# Patient Record
Sex: MALE | Race: BLACK OR AFRICAN AMERICAN | URBAN - METROPOLITAN AREA
[De-identification: names, ages, dates, MRNs, and addresses within clinical notes are randomized per-mention and may not be internally consistent; named-entity substitution may affect disease eponyms.]

---

## 2017-01-10 ENCOUNTER — OFFICE VISIT (OUTPATIENT)
Dept: INTERNAL MEDICINE CLINIC | Age: 57
End: 2017-01-10

## 2017-01-10 VITALS
DIASTOLIC BLOOD PRESSURE: 72 MMHG | WEIGHT: 203.8 LBS | BODY MASS INDEX: 32.89 KG/M2 | SYSTOLIC BLOOD PRESSURE: 130 MMHG | HEART RATE: 76 BPM | OXYGEN SATURATION: 97 %

## 2017-01-10 DIAGNOSIS — E78.00 PURE HYPERCHOLESTEROLEMIA: ICD-10-CM

## 2017-01-10 DIAGNOSIS — Z23 NEED FOR PNEUMOCOCCAL VACCINE: ICD-10-CM

## 2017-01-10 DIAGNOSIS — I10 ESSENTIAL HYPERTENSION: Primary | ICD-10-CM

## 2017-01-10 DIAGNOSIS — Z87.39 HISTORY OF GOUT: ICD-10-CM

## 2017-01-10 LAB
ANION GAP SERPL CALCULATED.3IONS-SCNC: 15 MMOL/L (ref 3–16)
BUN BLDV-MCNC: 10 MG/DL (ref 7–20)
CALCIUM SERPL-MCNC: 9.3 MG/DL (ref 8.3–10.6)
CHLORIDE BLD-SCNC: 103 MMOL/L (ref 99–110)
CHOLESTEROL, TOTAL: 152 MG/DL (ref 0–199)
CO2: 24 MMOL/L (ref 21–32)
CREAT SERPL-MCNC: 0.8 MG/DL (ref 0.9–1.3)
CREATININE URINE: 211.4 MG/DL (ref 39–259)
GFR AFRICAN AMERICAN: >60
GFR NON-AFRICAN AMERICAN: >60
GLUCOSE BLD-MCNC: 102 MG/DL (ref 70–99)
HDLC SERPL-MCNC: 31 MG/DL (ref 40–60)
LDL CHOLESTEROL CALCULATED: 100 MG/DL
MICROALBUMIN UR-MCNC: <1.2 MG/DL
MICROALBUMIN/CREAT UR-RTO: NORMAL MG/G (ref 0–30)
POTASSIUM SERPL-SCNC: 4.4 MMOL/L (ref 3.5–5.1)
SODIUM BLD-SCNC: 142 MMOL/L (ref 136–145)
TRIGL SERPL-MCNC: 104 MG/DL (ref 0–150)
VLDLC SERPL CALC-MCNC: 21 MG/DL

## 2017-01-10 PROCEDURE — G8484 FLU IMMUNIZE NO ADMIN: HCPCS | Performed by: INTERNAL MEDICINE

## 2017-01-10 PROCEDURE — G0009 ADMIN PNEUMOCOCCAL VACCINE: HCPCS | Performed by: INTERNAL MEDICINE

## 2017-01-10 PROCEDURE — G8427 DOCREV CUR MEDS BY ELIG CLIN: HCPCS | Performed by: INTERNAL MEDICINE

## 2017-01-10 PROCEDURE — 4004F PT TOBACCO SCREEN RCVD TLK: CPT | Performed by: INTERNAL MEDICINE

## 2017-01-10 PROCEDURE — 3017F COLORECTAL CA SCREEN DOC REV: CPT | Performed by: INTERNAL MEDICINE

## 2017-01-10 PROCEDURE — 99214 OFFICE O/P EST MOD 30 MIN: CPT | Performed by: INTERNAL MEDICINE

## 2017-01-10 PROCEDURE — 90732 PPSV23 VACC 2 YRS+ SUBQ/IM: CPT | Performed by: INTERNAL MEDICINE

## 2017-01-10 PROCEDURE — G8419 CALC BMI OUT NRM PARAM NOF/U: HCPCS | Performed by: INTERNAL MEDICINE

## 2017-01-10 RX ORDER — ALLOPURINOL 300 MG/1
300 TABLET ORAL DAILY
Qty: 30 TABLET | Refills: 3 | Status: SHIPPED | OUTPATIENT
Start: 2017-01-10 | End: 2017-01-11 | Stop reason: SDUPTHER

## 2017-01-11 ENCOUNTER — TELEPHONE (OUTPATIENT)
Dept: INTERNAL MEDICINE CLINIC | Age: 57
End: 2017-01-11

## 2017-01-11 DIAGNOSIS — I10 ESSENTIAL HYPERTENSION: ICD-10-CM

## 2017-01-11 DIAGNOSIS — Z87.39 HISTORY OF GOUT: ICD-10-CM

## 2017-01-11 DIAGNOSIS — E78.5 HYPERLIPIDEMIA, UNSPECIFIED HYPERLIPIDEMIA TYPE: ICD-10-CM

## 2017-01-11 DIAGNOSIS — M10.9 ACUTE GOUT OF RIGHT FOOT, UNSPECIFIED CAUSE: ICD-10-CM

## 2017-01-11 RX ORDER — AMLODIPINE BESYLATE 5 MG/1
5 TABLET ORAL DAILY
Qty: 30 TABLET | Refills: 2 | Status: SHIPPED | OUTPATIENT
Start: 2017-01-11 | End: 2017-02-09 | Stop reason: SDUPTHER

## 2017-01-11 RX ORDER — ALLOPURINOL 300 MG/1
300 TABLET ORAL DAILY
Qty: 30 TABLET | Refills: 3 | Status: SHIPPED | OUTPATIENT
Start: 2017-01-11 | End: 2017-02-09 | Stop reason: SDUPTHER

## 2017-01-11 RX ORDER — METHOCARBAMOL 500 MG/1
500 TABLET, FILM COATED ORAL 3 TIMES DAILY
Qty: 30 TABLET | Refills: 0 | Status: SHIPPED | OUTPATIENT
Start: 2017-01-11 | End: 2017-02-07 | Stop reason: SDUPTHER

## 2017-01-11 RX ORDER — INDOMETHACIN 50 MG/1
50 CAPSULE ORAL 3 TIMES DAILY
Qty: 42 CAPSULE | Refills: 0 | Status: SHIPPED | OUTPATIENT
Start: 2017-01-11 | End: 2018-12-24

## 2017-01-11 RX ORDER — ATORVASTATIN CALCIUM 40 MG/1
40 TABLET, FILM COATED ORAL DAILY
Qty: 30 TABLET | Refills: 2 | Status: SHIPPED | OUTPATIENT
Start: 2017-01-11 | End: 2017-02-09 | Stop reason: SDUPTHER

## 2017-01-11 RX ORDER — COLCHICINE 0.6 MG/1
0.6 TABLET ORAL DAILY
Qty: 30 TABLET | Refills: 2 | Status: SHIPPED | OUTPATIENT
Start: 2017-01-11 | End: 2017-02-09 | Stop reason: SDUPTHER

## 2017-02-09 ENCOUNTER — TELEPHONE (OUTPATIENT)
Dept: INTERNAL MEDICINE CLINIC | Age: 57
End: 2017-02-09

## 2017-02-09 DIAGNOSIS — Z87.39 HISTORY OF GOUT: ICD-10-CM

## 2017-02-09 DIAGNOSIS — I10 ESSENTIAL HYPERTENSION: ICD-10-CM

## 2017-02-09 DIAGNOSIS — E78.5 HYPERLIPIDEMIA, UNSPECIFIED HYPERLIPIDEMIA TYPE: ICD-10-CM

## 2017-02-09 DIAGNOSIS — M10.9 ACUTE GOUT OF RIGHT FOOT, UNSPECIFIED CAUSE: ICD-10-CM

## 2017-02-09 RX ORDER — COLCHICINE 0.6 MG/1
0.6 TABLET ORAL DAILY
Qty: 90 TABLET | Refills: 2 | Status: SHIPPED | OUTPATIENT
Start: 2017-02-09 | End: 2018-03-22

## 2017-02-09 RX ORDER — AMLODIPINE BESYLATE 5 MG/1
5 TABLET ORAL DAILY
Qty: 90 TABLET | Refills: 2 | Status: SHIPPED | OUTPATIENT
Start: 2017-02-09 | End: 2017-11-29 | Stop reason: SDUPTHER

## 2017-02-09 RX ORDER — ALLOPURINOL 300 MG/1
300 TABLET ORAL DAILY
Qty: 90 TABLET | Refills: 3 | Status: SHIPPED | OUTPATIENT
Start: 2017-02-09 | End: 2018-04-19 | Stop reason: SDUPTHER

## 2017-02-09 RX ORDER — ATORVASTATIN CALCIUM 40 MG/1
40 TABLET, FILM COATED ORAL DAILY
Qty: 90 TABLET | Refills: 2 | Status: SHIPPED | OUTPATIENT
Start: 2017-02-09 | End: 2018-03-08 | Stop reason: SDUPTHER

## 2017-02-21 ENCOUNTER — OFFICE VISIT (OUTPATIENT)
Dept: INTERNAL MEDICINE CLINIC | Age: 57
End: 2017-02-21

## 2017-02-21 VITALS
HEART RATE: 69 BPM | OXYGEN SATURATION: 97 % | SYSTOLIC BLOOD PRESSURE: 116 MMHG | HEIGHT: 66 IN | BODY MASS INDEX: 32.4 KG/M2 | WEIGHT: 201.6 LBS | DIASTOLIC BLOOD PRESSURE: 72 MMHG

## 2017-02-21 DIAGNOSIS — I10 ESSENTIAL HYPERTENSION: ICD-10-CM

## 2017-02-21 DIAGNOSIS — F17.208 NICOTINE DEPENDENCE WITH OTHER NICOTINE-INDUCED DISORDER: ICD-10-CM

## 2017-02-21 DIAGNOSIS — M62.838 MUSCLE SPASM: ICD-10-CM

## 2017-02-21 DIAGNOSIS — E78.00 PURE HYPERCHOLESTEROLEMIA: ICD-10-CM

## 2017-02-21 PROCEDURE — G8484 FLU IMMUNIZE NO ADMIN: HCPCS | Performed by: INTERNAL MEDICINE

## 2017-02-21 PROCEDURE — 3017F COLORECTAL CA SCREEN DOC REV: CPT | Performed by: INTERNAL MEDICINE

## 2017-02-21 PROCEDURE — G8417 CALC BMI ABV UP PARAM F/U: HCPCS | Performed by: INTERNAL MEDICINE

## 2017-02-21 PROCEDURE — 4004F PT TOBACCO SCREEN RCVD TLK: CPT | Performed by: INTERNAL MEDICINE

## 2017-02-21 PROCEDURE — 99214 OFFICE O/P EST MOD 30 MIN: CPT | Performed by: INTERNAL MEDICINE

## 2017-02-21 PROCEDURE — G8427 DOCREV CUR MEDS BY ELIG CLIN: HCPCS | Performed by: INTERNAL MEDICINE

## 2017-02-21 RX ORDER — METHOCARBAMOL 500 MG/1
500 TABLET, FILM COATED ORAL DAILY PRN
Qty: 30 TABLET | Refills: 1 | Status: SHIPPED | OUTPATIENT
Start: 2017-02-21 | End: 2017-03-23

## 2017-02-21 ASSESSMENT — PATIENT HEALTH QUESTIONNAIRE - PHQ9
SUM OF ALL RESPONSES TO PHQ9 QUESTIONS 1 & 2: 0
SUM OF ALL RESPONSES TO PHQ QUESTIONS 1-9: 0
2. FEELING DOWN, DEPRESSED OR HOPELESS: 0
1. LITTLE INTEREST OR PLEASURE IN DOING THINGS: 0

## 2017-03-06 ASSESSMENT — ENCOUNTER SYMPTOMS
RESPIRATORY NEGATIVE: 1
EYES NEGATIVE: 1
GASTROINTESTINAL NEGATIVE: 1

## 2017-04-05 RX ORDER — METHOCARBAMOL 500 MG/1
TABLET, FILM COATED ORAL
Qty: 30 TABLET | Refills: 1 | Status: SHIPPED | OUTPATIENT
Start: 2017-04-05 | End: 2017-05-26 | Stop reason: SDUPTHER

## 2017-05-15 ENCOUNTER — OFFICE VISIT (OUTPATIENT)
Dept: INTERNAL MEDICINE CLINIC | Age: 57
End: 2017-05-15

## 2017-05-15 VITALS
BODY MASS INDEX: 32.12 KG/M2 | SYSTOLIC BLOOD PRESSURE: 168 MMHG | TEMPERATURE: 97.3 F | DIASTOLIC BLOOD PRESSURE: 80 MMHG | OXYGEN SATURATION: 98 % | WEIGHT: 199 LBS | HEART RATE: 66 BPM

## 2017-05-15 DIAGNOSIS — M10.9 ACUTE GOUT OF LEFT WRIST, UNSPECIFIED CAUSE: Primary | ICD-10-CM

## 2017-05-15 DIAGNOSIS — M10.9 ACUTE GOUT OF RIGHT FOOT, UNSPECIFIED CAUSE: ICD-10-CM

## 2017-05-15 DIAGNOSIS — I10 ESSENTIAL HYPERTENSION: ICD-10-CM

## 2017-05-15 DIAGNOSIS — M25.532 ARTHRALGIA OF LEFT WRIST: ICD-10-CM

## 2017-05-15 LAB — URIC ACID, SERUM: 2.8 MG/DL (ref 3.5–7.2)

## 2017-05-15 PROCEDURE — 3017F COLORECTAL CA SCREEN DOC REV: CPT | Performed by: NURSE PRACTITIONER

## 2017-05-15 PROCEDURE — 96372 THER/PROPH/DIAG INJ SC/IM: CPT | Performed by: NURSE PRACTITIONER

## 2017-05-15 PROCEDURE — G8427 DOCREV CUR MEDS BY ELIG CLIN: HCPCS | Performed by: NURSE PRACTITIONER

## 2017-05-15 PROCEDURE — 99213 OFFICE O/P EST LOW 20 MIN: CPT | Performed by: NURSE PRACTITIONER

## 2017-05-15 PROCEDURE — 4004F PT TOBACCO SCREEN RCVD TLK: CPT | Performed by: NURSE PRACTITIONER

## 2017-05-15 PROCEDURE — G8417 CALC BMI ABV UP PARAM F/U: HCPCS | Performed by: NURSE PRACTITIONER

## 2017-05-15 RX ORDER — TRIAMCINOLONE ACETONIDE 40 MG/ML
40 INJECTION, SUSPENSION INTRA-ARTICULAR; INTRAMUSCULAR ONCE
Status: COMPLETED | OUTPATIENT
Start: 2017-05-15 | End: 2017-05-15

## 2017-05-15 RX ADMIN — TRIAMCINOLONE ACETONIDE 40 MG: 40 INJECTION, SUSPENSION INTRA-ARTICULAR; INTRAMUSCULAR at 09:46

## 2017-05-15 ASSESSMENT — ENCOUNTER SYMPTOMS
EYES NEGATIVE: 1
GASTROINTESTINAL NEGATIVE: 1
RESPIRATORY NEGATIVE: 1
ALLERGIC/IMMUNOLOGIC NEGATIVE: 1

## 2017-05-16 ENCOUNTER — TELEPHONE (OUTPATIENT)
Dept: INTERNAL MEDICINE CLINIC | Age: 57
End: 2017-05-16

## 2017-05-23 ENCOUNTER — OFFICE VISIT (OUTPATIENT)
Dept: INTERNAL MEDICINE CLINIC | Age: 57
End: 2017-05-23

## 2017-05-23 VITALS
OXYGEN SATURATION: 98 % | HEART RATE: 59 BPM | BODY MASS INDEX: 32.77 KG/M2 | DIASTOLIC BLOOD PRESSURE: 80 MMHG | SYSTOLIC BLOOD PRESSURE: 138 MMHG | WEIGHT: 203 LBS

## 2017-05-23 DIAGNOSIS — Z87.39 H/O: GOUT: ICD-10-CM

## 2017-05-23 DIAGNOSIS — I10 ESSENTIAL HYPERTENSION: ICD-10-CM

## 2017-05-23 DIAGNOSIS — E78.49 OTHER HYPERLIPIDEMIA: ICD-10-CM

## 2017-05-23 PROCEDURE — G8427 DOCREV CUR MEDS BY ELIG CLIN: HCPCS | Performed by: INTERNAL MEDICINE

## 2017-05-23 PROCEDURE — G8417 CALC BMI ABV UP PARAM F/U: HCPCS | Performed by: INTERNAL MEDICINE

## 2017-05-23 PROCEDURE — 4004F PT TOBACCO SCREEN RCVD TLK: CPT | Performed by: INTERNAL MEDICINE

## 2017-05-23 PROCEDURE — 99214 OFFICE O/P EST MOD 30 MIN: CPT | Performed by: INTERNAL MEDICINE

## 2017-05-23 PROCEDURE — 3017F COLORECTAL CA SCREEN DOC REV: CPT | Performed by: INTERNAL MEDICINE

## 2017-05-26 RX ORDER — METHOCARBAMOL 500 MG/1
TABLET, FILM COATED ORAL
Qty: 30 TABLET | Refills: 1 | Status: SHIPPED | OUTPATIENT
Start: 2017-05-26 | End: 2017-08-08 | Stop reason: SDUPTHER

## 2017-05-29 ASSESSMENT — ENCOUNTER SYMPTOMS
EYES NEGATIVE: 1
GASTROINTESTINAL NEGATIVE: 1
RESPIRATORY NEGATIVE: 1

## 2017-06-02 DIAGNOSIS — M19.90 ARTHRITIS: Primary | ICD-10-CM

## 2017-06-05 DIAGNOSIS — M19.90 ARTHRITIS: ICD-10-CM

## 2017-06-05 LAB
C-REACTIVE PROTEIN: 0.7 MG/L (ref 0–5.1)
RHEUMATOID FACTOR: <10 IU/ML
SEDIMENTATION RATE, ERYTHROCYTE: 10 MM/HR (ref 0–20)

## 2017-06-06 LAB
ANA INTERPRETATION: NORMAL
ANTI-NUCLEAR ANTIBODY (ANA): NEGATIVE

## 2017-07-19 ENCOUNTER — TELEPHONE (OUTPATIENT)
Dept: INTERNAL MEDICINE CLINIC | Age: 57
End: 2017-07-19

## 2017-07-19 RX ORDER — COLCHICINE 0.6 MG/1
1 CAPSULE ORAL DAILY
Qty: 90 CAPSULE | Refills: 0 | COMMUNITY
Start: 2017-07-19 | End: 2019-07-25 | Stop reason: SDUPTHER

## 2017-08-08 RX ORDER — METHOCARBAMOL 500 MG/1
TABLET, FILM COATED ORAL
Qty: 30 TABLET | Refills: 1 | Status: SHIPPED | OUTPATIENT
Start: 2017-08-08 | End: 2017-10-03 | Stop reason: SDUPTHER

## 2017-10-03 RX ORDER — METHOCARBAMOL 500 MG/1
TABLET, FILM COATED ORAL
Qty: 90 TABLET | Refills: 1 | Status: SHIPPED | OUTPATIENT
Start: 2017-10-03 | End: 2018-03-22

## 2017-11-29 DIAGNOSIS — I10 ESSENTIAL HYPERTENSION: ICD-10-CM

## 2017-12-01 RX ORDER — AMLODIPINE BESYLATE 5 MG/1
5 TABLET ORAL DAILY
Qty: 90 TABLET | Refills: 2 | Status: SHIPPED | OUTPATIENT
Start: 2017-12-01 | End: 2018-08-27 | Stop reason: SDUPTHER

## 2018-03-08 DIAGNOSIS — E78.5 HYPERLIPIDEMIA, UNSPECIFIED HYPERLIPIDEMIA TYPE: ICD-10-CM

## 2018-03-08 RX ORDER — ATORVASTATIN CALCIUM 40 MG/1
40 TABLET, FILM COATED ORAL DAILY
Qty: 90 TABLET | Refills: 2 | Status: SHIPPED | OUTPATIENT
Start: 2018-03-08 | End: 2018-11-22 | Stop reason: SDUPTHER

## 2018-03-22 ENCOUNTER — OFFICE VISIT (OUTPATIENT)
Dept: INTERNAL MEDICINE CLINIC | Age: 58
End: 2018-03-22

## 2018-03-22 VITALS
HEIGHT: 66 IN | BODY MASS INDEX: 33.59 KG/M2 | HEART RATE: 66 BPM | SYSTOLIC BLOOD PRESSURE: 170 MMHG | WEIGHT: 209 LBS | OXYGEN SATURATION: 97 % | DIASTOLIC BLOOD PRESSURE: 70 MMHG

## 2018-03-22 DIAGNOSIS — I10 ESSENTIAL HYPERTENSION: ICD-10-CM

## 2018-03-22 DIAGNOSIS — E78.2 MIXED HYPERLIPIDEMIA: ICD-10-CM

## 2018-03-22 DIAGNOSIS — M62.838 MUSCLE SPASM: ICD-10-CM

## 2018-03-22 DIAGNOSIS — Z87.39 H/O: GOUT: ICD-10-CM

## 2018-03-22 PROCEDURE — 99214 OFFICE O/P EST MOD 30 MIN: CPT | Performed by: INTERNAL MEDICINE

## 2018-03-22 PROCEDURE — G8417 CALC BMI ABV UP PARAM F/U: HCPCS | Performed by: INTERNAL MEDICINE

## 2018-03-22 PROCEDURE — 3017F COLORECTAL CA SCREEN DOC REV: CPT | Performed by: INTERNAL MEDICINE

## 2018-03-22 PROCEDURE — G8427 DOCREV CUR MEDS BY ELIG CLIN: HCPCS | Performed by: INTERNAL MEDICINE

## 2018-03-22 PROCEDURE — 4004F PT TOBACCO SCREEN RCVD TLK: CPT | Performed by: INTERNAL MEDICINE

## 2018-03-22 PROCEDURE — G8484 FLU IMMUNIZE NO ADMIN: HCPCS | Performed by: INTERNAL MEDICINE

## 2018-03-22 RX ORDER — ALLOPURINOL 100 MG/1
100 TABLET ORAL DAILY
Qty: 30 TABLET | Refills: 3 | Status: SHIPPED | OUTPATIENT
Start: 2018-03-22 | End: 2018-05-17

## 2018-03-22 RX ORDER — METHOCARBAMOL 500 MG/1
500 TABLET, FILM COATED ORAL 2 TIMES DAILY PRN
Qty: 60 TABLET | Refills: 1 | Status: SHIPPED | OUTPATIENT
Start: 2018-03-22 | End: 2018-05-14 | Stop reason: SDUPTHER

## 2018-03-22 ASSESSMENT — PATIENT HEALTH QUESTIONNAIRE - PHQ9
2. FEELING DOWN, DEPRESSED OR HOPELESS: 0
SUM OF ALL RESPONSES TO PHQ9 QUESTIONS 1 & 2: 0
1. LITTLE INTEREST OR PLEASURE IN DOING THINGS: 0
SUM OF ALL RESPONSES TO PHQ QUESTIONS 1-9: 0

## 2018-04-07 ASSESSMENT — ENCOUNTER SYMPTOMS
RESPIRATORY NEGATIVE: 1
EYES NEGATIVE: 1
GASTROINTESTINAL NEGATIVE: 1

## 2018-04-19 DIAGNOSIS — Z87.39 HISTORY OF GOUT: ICD-10-CM

## 2018-04-19 RX ORDER — ALLOPURINOL 300 MG/1
300 TABLET ORAL DAILY
Qty: 90 TABLET | Refills: 3 | Status: SHIPPED | OUTPATIENT
Start: 2018-04-19 | End: 2019-04-04 | Stop reason: SDUPTHER

## 2018-05-14 DIAGNOSIS — M62.838 MUSCLE SPASM: ICD-10-CM

## 2018-05-14 RX ORDER — METHOCARBAMOL 500 MG/1
500 TABLET, FILM COATED ORAL 2 TIMES DAILY PRN
Qty: 60 TABLET | Refills: 1 | Status: SHIPPED | OUTPATIENT
Start: 2018-05-14 | End: 2018-07-10 | Stop reason: SDUPTHER

## 2018-05-17 ENCOUNTER — OFFICE VISIT (OUTPATIENT)
Dept: INTERNAL MEDICINE CLINIC | Age: 58
End: 2018-05-17

## 2018-05-17 VITALS
BODY MASS INDEX: 33.75 KG/M2 | WEIGHT: 210 LBS | HEART RATE: 86 BPM | SYSTOLIC BLOOD PRESSURE: 148 MMHG | DIASTOLIC BLOOD PRESSURE: 86 MMHG | HEIGHT: 66 IN | OXYGEN SATURATION: 98 %

## 2018-05-17 DIAGNOSIS — I10 ESSENTIAL HYPERTENSION: ICD-10-CM

## 2018-05-17 DIAGNOSIS — Z13.1 ENCOUNTER FOR SCREENING FOR DIABETES MELLITUS: ICD-10-CM

## 2018-05-17 DIAGNOSIS — I73.9 PAD (PERIPHERAL ARTERY DISEASE) (HCC): ICD-10-CM

## 2018-05-17 DIAGNOSIS — G62.9 NEUROPATHY: ICD-10-CM

## 2018-05-17 DIAGNOSIS — F17.219 CIGARETTE NICOTINE DEPENDENCE WITH NICOTINE-INDUCED DISORDER: ICD-10-CM

## 2018-05-17 DIAGNOSIS — R06.02 SOB (SHORTNESS OF BREATH) ON EXERTION: ICD-10-CM

## 2018-05-17 DIAGNOSIS — E78.2 MIXED HYPERLIPIDEMIA: ICD-10-CM

## 2018-05-17 PROCEDURE — 3017F COLORECTAL CA SCREEN DOC REV: CPT | Performed by: INTERNAL MEDICINE

## 2018-05-17 PROCEDURE — G8427 DOCREV CUR MEDS BY ELIG CLIN: HCPCS | Performed by: INTERNAL MEDICINE

## 2018-05-17 PROCEDURE — 4004F PT TOBACCO SCREEN RCVD TLK: CPT | Performed by: INTERNAL MEDICINE

## 2018-05-17 PROCEDURE — G8417 CALC BMI ABV UP PARAM F/U: HCPCS | Performed by: INTERNAL MEDICINE

## 2018-05-17 PROCEDURE — 99214 OFFICE O/P EST MOD 30 MIN: CPT | Performed by: INTERNAL MEDICINE

## 2018-05-17 RX ORDER — GABAPENTIN 100 MG/1
100 CAPSULE ORAL 3 TIMES DAILY PRN
Qty: 90 CAPSULE | Refills: 3 | Status: SHIPPED | OUTPATIENT
Start: 2018-05-17 | End: 2018-09-04 | Stop reason: SDUPTHER

## 2018-05-28 PROBLEM — I73.9 PAD (PERIPHERAL ARTERY DISEASE) (HCC): Status: ACTIVE | Noted: 2018-05-28

## 2018-05-28 ASSESSMENT — ENCOUNTER SYMPTOMS
GASTROINTESTINAL NEGATIVE: 1
EYES NEGATIVE: 1

## 2018-05-29 ENCOUNTER — TELEPHONE (OUTPATIENT)
Dept: INTERNAL MEDICINE CLINIC | Age: 58
End: 2018-05-29

## 2018-06-19 ENCOUNTER — TELEPHONE (OUTPATIENT)
Dept: INTERNAL MEDICINE CLINIC | Age: 58
End: 2018-06-19

## 2018-06-19 DIAGNOSIS — M79.89 SWELLING OF LOWER EXTREMITY: Primary | ICD-10-CM

## 2018-06-19 DIAGNOSIS — I73.9 PAD (PERIPHERAL ARTERY DISEASE) (HCC): ICD-10-CM

## 2018-06-19 DIAGNOSIS — I82.593 CHRONIC DEEP VEIN THROMBOSIS (DVT) OF OTHER VEIN OF BOTH LOWER EXTREMITIES (HCC): ICD-10-CM

## 2018-06-20 ENCOUNTER — TELEPHONE (OUTPATIENT)
Dept: INTERNAL MEDICINE CLINIC | Age: 58
End: 2018-06-20

## 2018-06-20 DIAGNOSIS — I73.9 PAD (PERIPHERAL ARTERY DISEASE) (HCC): ICD-10-CM

## 2018-06-20 DIAGNOSIS — M79.89 SWELLING OF LOWER EXTREMITY: Primary | ICD-10-CM

## 2018-06-27 ENCOUNTER — TELEPHONE (OUTPATIENT)
Dept: INTERNAL MEDICINE CLINIC | Age: 58
End: 2018-06-27

## 2018-07-10 DIAGNOSIS — M62.838 MUSCLE SPASM: ICD-10-CM

## 2018-07-10 RX ORDER — METHOCARBAMOL 500 MG/1
500 TABLET, FILM COATED ORAL 2 TIMES DAILY PRN
Qty: 60 TABLET | Refills: 1 | Status: SHIPPED | OUTPATIENT
Start: 2018-07-10 | End: 2018-08-09

## 2018-08-27 DIAGNOSIS — I10 ESSENTIAL HYPERTENSION: ICD-10-CM

## 2018-08-27 RX ORDER — AMLODIPINE BESYLATE 5 MG/1
5 TABLET ORAL DAILY
Qty: 90 TABLET | Refills: 2 | Status: SHIPPED | OUTPATIENT
Start: 2018-08-27 | End: 2019-05-20 | Stop reason: SDUPTHER

## 2018-09-04 DIAGNOSIS — G62.9 NEUROPATHY: ICD-10-CM

## 2018-09-04 RX ORDER — GABAPENTIN 100 MG/1
100 CAPSULE ORAL 3 TIMES DAILY PRN
Qty: 90 CAPSULE | Refills: 1 | Status: SHIPPED | OUTPATIENT
Start: 2018-09-04 | End: 2019-03-21 | Stop reason: SDUPTHER

## 2018-09-05 RX ORDER — METHOCARBAMOL 500 MG/1
TABLET, FILM COATED ORAL
Qty: 60 TABLET | Refills: 1 | Status: SHIPPED | OUTPATIENT
Start: 2018-09-05 | End: 2018-12-12 | Stop reason: SDUPTHER

## 2018-09-13 ENCOUNTER — OFFICE VISIT (OUTPATIENT)
Dept: INTERNAL MEDICINE CLINIC | Age: 58
End: 2018-09-13

## 2018-09-13 VITALS
DIASTOLIC BLOOD PRESSURE: 70 MMHG | HEIGHT: 66 IN | BODY MASS INDEX: 33.75 KG/M2 | WEIGHT: 210 LBS | OXYGEN SATURATION: 98 % | HEART RATE: 86 BPM | SYSTOLIC BLOOD PRESSURE: 136 MMHG

## 2018-09-13 DIAGNOSIS — Z87.39 H/O: GOUT: ICD-10-CM

## 2018-09-13 DIAGNOSIS — R73.9 ELEVATED BLOOD SUGAR: ICD-10-CM

## 2018-09-13 DIAGNOSIS — R97.20 ELEVATED PSA: ICD-10-CM

## 2018-09-13 DIAGNOSIS — Z23 NEED FOR PROPHYLACTIC VACCINATION AND INOCULATION AGAINST VARICELLA: ICD-10-CM

## 2018-09-13 DIAGNOSIS — I10 ESSENTIAL HYPERTENSION: ICD-10-CM

## 2018-09-13 DIAGNOSIS — J98.4 RESTRICTIVE LUNG DISEASE: ICD-10-CM

## 2018-09-13 DIAGNOSIS — I10 ESSENTIAL HYPERTENSION: Primary | ICD-10-CM

## 2018-09-13 DIAGNOSIS — E78.2 MIXED HYPERLIPIDEMIA: ICD-10-CM

## 2018-09-13 DIAGNOSIS — Z12.11 SCREENING FOR COLON CANCER: ICD-10-CM

## 2018-09-13 LAB
ANION GAP SERPL CALCULATED.3IONS-SCNC: 16 MMOL/L (ref 3–16)
BUN BLDV-MCNC: 8 MG/DL (ref 7–20)
CALCIUM SERPL-MCNC: 9.8 MG/DL (ref 8.3–10.6)
CHLORIDE BLD-SCNC: 103 MMOL/L (ref 99–110)
CHOLESTEROL, TOTAL: 161 MG/DL (ref 0–199)
CO2: 23 MMOL/L (ref 21–32)
CREAT SERPL-MCNC: 0.8 MG/DL (ref 0.8–1.3)
CREATININE URINE: 122.1 MG/DL (ref 39–259)
GFR AFRICAN AMERICAN: >60
GFR NON-AFRICAN AMERICAN: >60
GLUCOSE BLD-MCNC: 116 MG/DL (ref 70–99)
HDLC SERPL-MCNC: 32 MG/DL (ref 40–60)
LDL CHOLESTEROL CALCULATED: 94 MG/DL
MICROALBUMIN UR-MCNC: <1.2 MG/DL
MICROALBUMIN/CREAT UR-RTO: NORMAL MG/G (ref 0–30)
POTASSIUM SERPL-SCNC: 4.8 MMOL/L (ref 3.5–5.1)
PROSTATE SPECIFIC ANTIGEN: 3.72 NG/ML (ref 0–4)
SODIUM BLD-SCNC: 142 MMOL/L (ref 136–145)
TRIGL SERPL-MCNC: 175 MG/DL (ref 0–150)
TSH REFLEX: 0.95 UIU/ML (ref 0.27–4.2)
URIC ACID, SERUM: 3.4 MG/DL (ref 3.5–7.2)
VLDLC SERPL CALC-MCNC: 35 MG/DL

## 2018-09-13 PROCEDURE — G8427 DOCREV CUR MEDS BY ELIG CLIN: HCPCS | Performed by: INTERNAL MEDICINE

## 2018-09-13 PROCEDURE — 99214 OFFICE O/P EST MOD 30 MIN: CPT | Performed by: INTERNAL MEDICINE

## 2018-09-13 PROCEDURE — 4004F PT TOBACCO SCREEN RCVD TLK: CPT | Performed by: INTERNAL MEDICINE

## 2018-09-13 PROCEDURE — G8417 CALC BMI ABV UP PARAM F/U: HCPCS | Performed by: INTERNAL MEDICINE

## 2018-09-13 PROCEDURE — 3017F COLORECTAL CA SCREEN DOC REV: CPT | Performed by: INTERNAL MEDICINE

## 2018-09-13 ASSESSMENT — PATIENT HEALTH QUESTIONNAIRE - PHQ9
1. LITTLE INTEREST OR PLEASURE IN DOING THINGS: 0
2. FEELING DOWN, DEPRESSED OR HOPELESS: 0
SUM OF ALL RESPONSES TO PHQ QUESTIONS 1-9: 0
SUM OF ALL RESPONSES TO PHQ9 QUESTIONS 1 & 2: 0
SUM OF ALL RESPONSES TO PHQ QUESTIONS 1-9: 0

## 2018-09-14 LAB
ESTIMATED AVERAGE GLUCOSE: 145.6 MG/DL
HBA1C MFR BLD: 6.7 %

## 2018-09-30 ASSESSMENT — ENCOUNTER SYMPTOMS
GASTROINTESTINAL NEGATIVE: 1
EYES NEGATIVE: 1

## 2018-11-22 DIAGNOSIS — E78.5 HYPERLIPIDEMIA, UNSPECIFIED HYPERLIPIDEMIA TYPE: ICD-10-CM

## 2018-11-23 RX ORDER — ATORVASTATIN CALCIUM 40 MG/1
40 TABLET, FILM COATED ORAL DAILY
Qty: 90 TABLET | Refills: 2 | Status: SHIPPED | OUTPATIENT
Start: 2018-11-23 | End: 2019-06-26 | Stop reason: SDUPTHER

## 2018-12-12 ENCOUNTER — OFFICE VISIT (OUTPATIENT)
Dept: INTERNAL MEDICINE CLINIC | Age: 58
End: 2018-12-12
Payer: MEDICARE

## 2018-12-12 VITALS
BODY MASS INDEX: 34.2 KG/M2 | WEIGHT: 212.8 LBS | OXYGEN SATURATION: 98 % | DIASTOLIC BLOOD PRESSURE: 78 MMHG | SYSTOLIC BLOOD PRESSURE: 132 MMHG | HEIGHT: 66 IN | HEART RATE: 56 BPM

## 2018-12-12 DIAGNOSIS — I10 ESSENTIAL HYPERTENSION: ICD-10-CM

## 2018-12-12 DIAGNOSIS — Z12.11 COLON CANCER SCREENING: ICD-10-CM

## 2018-12-12 DIAGNOSIS — E78.2 MIXED HYPERLIPIDEMIA: ICD-10-CM

## 2018-12-12 DIAGNOSIS — M62.838 MUSCLE SPASM: ICD-10-CM

## 2018-12-12 DIAGNOSIS — E11.8 TYPE 2 DIABETES MELLITUS WITH COMPLICATION, WITHOUT LONG-TERM CURRENT USE OF INSULIN (HCC): Primary | ICD-10-CM

## 2018-12-12 DIAGNOSIS — Z87.39 H/O: GOUT: ICD-10-CM

## 2018-12-12 PROCEDURE — 99214 OFFICE O/P EST MOD 30 MIN: CPT | Performed by: INTERNAL MEDICINE

## 2018-12-12 PROCEDURE — 3044F HG A1C LEVEL LT 7.0%: CPT | Performed by: INTERNAL MEDICINE

## 2018-12-12 PROCEDURE — G8427 DOCREV CUR MEDS BY ELIG CLIN: HCPCS | Performed by: INTERNAL MEDICINE

## 2018-12-12 PROCEDURE — 2022F DILAT RTA XM EVC RTNOPTHY: CPT | Performed by: INTERNAL MEDICINE

## 2018-12-12 PROCEDURE — 4004F PT TOBACCO SCREEN RCVD TLK: CPT | Performed by: INTERNAL MEDICINE

## 2018-12-12 PROCEDURE — G8484 FLU IMMUNIZE NO ADMIN: HCPCS | Performed by: INTERNAL MEDICINE

## 2018-12-12 PROCEDURE — 3017F COLORECTAL CA SCREEN DOC REV: CPT | Performed by: INTERNAL MEDICINE

## 2018-12-12 PROCEDURE — G8417 CALC BMI ABV UP PARAM F/U: HCPCS | Performed by: INTERNAL MEDICINE

## 2018-12-12 RX ORDER — METHOCARBAMOL 500 MG/1
500 TABLET, FILM COATED ORAL DAILY PRN
Qty: 60 TABLET | Refills: 0 | Status: SHIPPED | OUTPATIENT
Start: 2018-12-12 | End: 2019-06-11 | Stop reason: SDUPTHER

## 2018-12-12 ASSESSMENT — PATIENT HEALTH QUESTIONNAIRE - PHQ9
SUM OF ALL RESPONSES TO PHQ9 QUESTIONS 1 & 2: 0
2. FEELING DOWN, DEPRESSED OR HOPELESS: 0
SUM OF ALL RESPONSES TO PHQ QUESTIONS 1-9: 0
1. LITTLE INTEREST OR PLEASURE IN DOING THINGS: 0
SUM OF ALL RESPONSES TO PHQ QUESTIONS 1-9: 0

## 2018-12-24 PROBLEM — E11.8 TYPE 2 DIABETES MELLITUS WITH COMPLICATION, WITHOUT LONG-TERM CURRENT USE OF INSULIN (HCC): Status: ACTIVE | Noted: 2018-12-24

## 2018-12-24 ASSESSMENT — ENCOUNTER SYMPTOMS
GASTROINTESTINAL NEGATIVE: 1
EYES NEGATIVE: 1
RESPIRATORY NEGATIVE: 1

## 2019-01-31 ENCOUNTER — OFFICE VISIT (OUTPATIENT)
Dept: INTERNAL MEDICINE CLINIC | Age: 59
End: 2019-01-31
Payer: MEDICARE

## 2019-01-31 VITALS
OXYGEN SATURATION: 98 % | WEIGHT: 212 LBS | SYSTOLIC BLOOD PRESSURE: 136 MMHG | HEIGHT: 66 IN | BODY MASS INDEX: 34.07 KG/M2 | HEART RATE: 70 BPM | DIASTOLIC BLOOD PRESSURE: 78 MMHG

## 2019-01-31 DIAGNOSIS — E78.2 MIXED HYPERLIPIDEMIA: ICD-10-CM

## 2019-01-31 DIAGNOSIS — Z12.11 COLON CANCER SCREENING: ICD-10-CM

## 2019-01-31 DIAGNOSIS — E11.8 TYPE 2 DIABETES MELLITUS WITH COMPLICATION, WITHOUT LONG-TERM CURRENT USE OF INSULIN (HCC): Primary | ICD-10-CM

## 2019-01-31 DIAGNOSIS — I10 ESSENTIAL HYPERTENSION: ICD-10-CM

## 2019-01-31 LAB
GLUCOSE BLD-MCNC: 102 MG/DL
HBA1C MFR BLD: 6.1 %

## 2019-01-31 PROCEDURE — 83036 HEMOGLOBIN GLYCOSYLATED A1C: CPT | Performed by: INTERNAL MEDICINE

## 2019-01-31 PROCEDURE — 3017F COLORECTAL CA SCREEN DOC REV: CPT | Performed by: INTERNAL MEDICINE

## 2019-01-31 PROCEDURE — 4004F PT TOBACCO SCREEN RCVD TLK: CPT | Performed by: INTERNAL MEDICINE

## 2019-01-31 PROCEDURE — G8417 CALC BMI ABV UP PARAM F/U: HCPCS | Performed by: INTERNAL MEDICINE

## 2019-01-31 PROCEDURE — 3044F HG A1C LEVEL LT 7.0%: CPT | Performed by: INTERNAL MEDICINE

## 2019-01-31 PROCEDURE — G8484 FLU IMMUNIZE NO ADMIN: HCPCS | Performed by: INTERNAL MEDICINE

## 2019-01-31 PROCEDURE — G8427 DOCREV CUR MEDS BY ELIG CLIN: HCPCS | Performed by: INTERNAL MEDICINE

## 2019-01-31 PROCEDURE — 99214 OFFICE O/P EST MOD 30 MIN: CPT | Performed by: INTERNAL MEDICINE

## 2019-01-31 PROCEDURE — 82962 GLUCOSE BLOOD TEST: CPT | Performed by: INTERNAL MEDICINE

## 2019-01-31 PROCEDURE — 2022F DILAT RTA XM EVC RTNOPTHY: CPT | Performed by: INTERNAL MEDICINE

## 2019-01-31 ASSESSMENT — PATIENT HEALTH QUESTIONNAIRE - PHQ9
SUM OF ALL RESPONSES TO PHQ9 QUESTIONS 1 & 2: 0
2. FEELING DOWN, DEPRESSED OR HOPELESS: 0
SUM OF ALL RESPONSES TO PHQ QUESTIONS 1-9: 0
SUM OF ALL RESPONSES TO PHQ QUESTIONS 1-9: 0
1. LITTLE INTEREST OR PLEASURE IN DOING THINGS: 0

## 2019-02-01 ASSESSMENT — ENCOUNTER SYMPTOMS
EYES NEGATIVE: 1
RESPIRATORY NEGATIVE: 1
GASTROINTESTINAL NEGATIVE: 1

## 2019-03-05 ENCOUNTER — OFFICE VISIT (OUTPATIENT)
Dept: INTERNAL MEDICINE CLINIC | Age: 59
End: 2019-03-05
Payer: MEDICARE

## 2019-03-05 VITALS
HEIGHT: 66 IN | SYSTOLIC BLOOD PRESSURE: 136 MMHG | HEART RATE: 68 BPM | OXYGEN SATURATION: 97 % | DIASTOLIC BLOOD PRESSURE: 76 MMHG | WEIGHT: 207 LBS | BODY MASS INDEX: 33.27 KG/M2

## 2019-03-05 DIAGNOSIS — E11.8 TYPE 2 DIABETES MELLITUS WITH COMPLICATION, WITHOUT LONG-TERM CURRENT USE OF INSULIN (HCC): ICD-10-CM

## 2019-03-05 DIAGNOSIS — I10 ESSENTIAL HYPERTENSION: Primary | ICD-10-CM

## 2019-03-05 DIAGNOSIS — Z87.39 H/O: GOUT: ICD-10-CM

## 2019-03-05 DIAGNOSIS — E78.2 MIXED HYPERLIPIDEMIA: ICD-10-CM

## 2019-03-05 PROCEDURE — 2022F DILAT RTA XM EVC RTNOPTHY: CPT | Performed by: INTERNAL MEDICINE

## 2019-03-05 PROCEDURE — 4004F PT TOBACCO SCREEN RCVD TLK: CPT | Performed by: INTERNAL MEDICINE

## 2019-03-05 PROCEDURE — 3044F HG A1C LEVEL LT 7.0%: CPT | Performed by: INTERNAL MEDICINE

## 2019-03-05 PROCEDURE — 3017F COLORECTAL CA SCREEN DOC REV: CPT | Performed by: INTERNAL MEDICINE

## 2019-03-05 PROCEDURE — G8484 FLU IMMUNIZE NO ADMIN: HCPCS | Performed by: INTERNAL MEDICINE

## 2019-03-05 PROCEDURE — 99214 OFFICE O/P EST MOD 30 MIN: CPT | Performed by: INTERNAL MEDICINE

## 2019-03-05 PROCEDURE — G8427 DOCREV CUR MEDS BY ELIG CLIN: HCPCS | Performed by: INTERNAL MEDICINE

## 2019-03-05 PROCEDURE — G8417 CALC BMI ABV UP PARAM F/U: HCPCS | Performed by: INTERNAL MEDICINE

## 2019-03-05 ASSESSMENT — PATIENT HEALTH QUESTIONNAIRE - PHQ9
SUM OF ALL RESPONSES TO PHQ QUESTIONS 1-9: 0
2. FEELING DOWN, DEPRESSED OR HOPELESS: 0
SUM OF ALL RESPONSES TO PHQ QUESTIONS 1-9: 0
SUM OF ALL RESPONSES TO PHQ9 QUESTIONS 1 & 2: 0
1. LITTLE INTEREST OR PLEASURE IN DOING THINGS: 0

## 2019-03-18 ASSESSMENT — ENCOUNTER SYMPTOMS
EYES NEGATIVE: 1
RESPIRATORY NEGATIVE: 1
GASTROINTESTINAL NEGATIVE: 1

## 2019-03-21 DIAGNOSIS — G62.9 NEUROPATHY: ICD-10-CM

## 2019-03-21 RX ORDER — GABAPENTIN 100 MG/1
100 CAPSULE ORAL 3 TIMES DAILY PRN
Qty: 90 CAPSULE | Refills: 1 | Status: SHIPPED | OUTPATIENT
Start: 2019-03-21 | End: 2020-01-24 | Stop reason: SDUPTHER

## 2019-04-04 DIAGNOSIS — Z87.39 HISTORY OF GOUT: ICD-10-CM

## 2019-04-04 RX ORDER — ALLOPURINOL 300 MG/1
300 TABLET ORAL DAILY
Qty: 90 TABLET | Refills: 2 | Status: SHIPPED | OUTPATIENT
Start: 2019-04-04 | End: 2019-10-24 | Stop reason: SDUPTHER

## 2019-05-08 ENCOUNTER — OFFICE VISIT (OUTPATIENT)
Dept: INTERNAL MEDICINE CLINIC | Age: 59
End: 2019-05-08
Payer: MEDICARE

## 2019-05-08 VITALS
SYSTOLIC BLOOD PRESSURE: 126 MMHG | HEART RATE: 66 BPM | WEIGHT: 199 LBS | DIASTOLIC BLOOD PRESSURE: 70 MMHG | OXYGEN SATURATION: 98 % | BODY MASS INDEX: 31.98 KG/M2 | HEIGHT: 66 IN

## 2019-05-08 DIAGNOSIS — Z87.39 H/O: GOUT: ICD-10-CM

## 2019-05-08 DIAGNOSIS — I10 ESSENTIAL HYPERTENSION: ICD-10-CM

## 2019-05-08 DIAGNOSIS — E11.8 TYPE 2 DIABETES MELLITUS WITH COMPLICATION, WITHOUT LONG-TERM CURRENT USE OF INSULIN (HCC): Primary | ICD-10-CM

## 2019-05-08 DIAGNOSIS — E78.2 MIXED HYPERLIPIDEMIA: ICD-10-CM

## 2019-05-08 LAB
CHP ED QC CHECK: NORMAL
GLUCOSE BLD-MCNC: 126 MG/DL
HBA1C MFR BLD: 6 %

## 2019-05-08 PROCEDURE — 82962 GLUCOSE BLOOD TEST: CPT | Performed by: INTERNAL MEDICINE

## 2019-05-08 PROCEDURE — G8417 CALC BMI ABV UP PARAM F/U: HCPCS | Performed by: INTERNAL MEDICINE

## 2019-05-08 PROCEDURE — 2022F DILAT RTA XM EVC RTNOPTHY: CPT | Performed by: INTERNAL MEDICINE

## 2019-05-08 PROCEDURE — 3044F HG A1C LEVEL LT 7.0%: CPT | Performed by: INTERNAL MEDICINE

## 2019-05-08 PROCEDURE — 99214 OFFICE O/P EST MOD 30 MIN: CPT | Performed by: INTERNAL MEDICINE

## 2019-05-08 PROCEDURE — G8427 DOCREV CUR MEDS BY ELIG CLIN: HCPCS | Performed by: INTERNAL MEDICINE

## 2019-05-08 PROCEDURE — 4004F PT TOBACCO SCREEN RCVD TLK: CPT | Performed by: INTERNAL MEDICINE

## 2019-05-08 PROCEDURE — 3017F COLORECTAL CA SCREEN DOC REV: CPT | Performed by: INTERNAL MEDICINE

## 2019-05-08 PROCEDURE — 83036 HEMOGLOBIN GLYCOSYLATED A1C: CPT | Performed by: INTERNAL MEDICINE

## 2019-05-08 ASSESSMENT — PATIENT HEALTH QUESTIONNAIRE - PHQ9
SUM OF ALL RESPONSES TO PHQ QUESTIONS 1-9: 0
2. FEELING DOWN, DEPRESSED OR HOPELESS: 0
1. LITTLE INTEREST OR PLEASURE IN DOING THINGS: 0
SUM OF ALL RESPONSES TO PHQ QUESTIONS 1-9: 0
SUM OF ALL RESPONSES TO PHQ9 QUESTIONS 1 & 2: 0

## 2019-05-08 NOTE — PROGRESS NOTES
Subjective:      Patient ID: Trudi Morales is a [address] [place] [address] y[address] male. He is here for a check up and f/u on hyperlipidemia, HTN, and diabetes, type 2. He is taking medication as prescribed and continues to work on a more balanced meal plan and more physical activity. Current Outpatient Medications:     allopurinol (ZYLOPRIM) 300 MG tablet, TAKE 1 TABLET BY MOUTH DAILY FOR GOUT, Disp: 90 tablet, Rfl: 2    methocarbamol (ROBAXIN) 500 MG tablet, Take 1 tablet by mouth daily as needed (muscle spasm.), Disp: 60 tablet, Rfl: 0    Colchicine (MITIGARE) 0.6 MG CAPS, Take 1 capsule by mouth Daily, Disp: 90 capsule, Rfl: 0    gabapentin (NEURONTIN) 100 MG capsule, Take 1 capsule by mouth 3 times daily as needed (nerve pain.) for up to 30 days. , Disp: 90 capsule, Rfl: 1    metFORMIN (GLUCOPHAGE) 500 MG tablet, Take 1 tablet by mouth daily (with breakfast) for diabetes. , Disp: 90 tablet, Rfl: 2    atorvastatin (LIPITOR) 40 MG tablet, TAKE 1 TABLET BY MOUTH DAILY, Disp: 90 tablet, Rfl: 2    amLODIPine (NORVASC) 5 MG tablet, TAKE 1 TABLET BY MOUTH DAILY, Disp: 90 tablet, Rfl: 2    aspirin 81 MG tablet, Take 1 tablet by mouth daily, Disp: 30 tablet, Rfl: 11     Review of Systems   Constitutional: Negative. HENT: Negative. Eyes: Negative. Respiratory: Negative. Cardiovascular:        HTN, on stable medication regimen. See list.   Gastrointestinal: Negative. Endocrine:        Diabetes, treated with metformin. Last a1c 6.1. Hyperlipidemia, treated with statin. LDL 94. Genitourinary: Negative. Musculoskeletal:        H/O gout. No recent flare up. Uric acid 3.4. Experiences occasional muscle spasm. Skin: Negative. Neurological: Negative. Psychiatric/Behavioral: Negative. Objective:   Physical Exam   Constitutional: He is oriented to person, place, and time. He appears well-developed and well-nourished. No distress.    HENT:   Head: Normocephalic

## 2019-05-20 DIAGNOSIS — I10 ESSENTIAL HYPERTENSION: ICD-10-CM

## 2019-05-21 RX ORDER — AMLODIPINE BESYLATE 5 MG/1
5 TABLET ORAL DAILY
Qty: 90 TABLET | Refills: 2 | Status: SHIPPED | OUTPATIENT
Start: 2019-05-21 | End: 2020-01-22

## 2019-06-11 DIAGNOSIS — M62.838 MUSCLE SPASM: ICD-10-CM

## 2019-06-14 RX ORDER — METHOCARBAMOL 500 MG/1
TABLET, FILM COATED ORAL
Qty: 60 TABLET | Refills: 1 | Status: SHIPPED | OUTPATIENT
Start: 2019-06-14 | End: 2019-10-24 | Stop reason: SDUPTHER

## 2019-06-26 DIAGNOSIS — E78.5 HYPERLIPIDEMIA, UNSPECIFIED HYPERLIPIDEMIA TYPE: ICD-10-CM

## 2019-06-26 DIAGNOSIS — E11.8 TYPE 2 DIABETES MELLITUS WITH COMPLICATION, WITHOUT LONG-TERM CURRENT USE OF INSULIN (HCC): ICD-10-CM

## 2019-06-27 RX ORDER — ATORVASTATIN CALCIUM 40 MG/1
40 TABLET, FILM COATED ORAL DAILY
Qty: 90 TABLET | Refills: 2 | Status: SHIPPED | OUTPATIENT
Start: 2019-06-27 | End: 2019-10-24 | Stop reason: SDUPTHER

## 2019-07-24 ENCOUNTER — OFFICE VISIT (OUTPATIENT)
Dept: INTERNAL MEDICINE CLINIC | Age: 59
End: 2019-07-24
Payer: MEDICARE

## 2019-07-24 VITALS
BODY MASS INDEX: 29.1 KG/M2 | HEART RATE: 67 BPM | HEIGHT: 68 IN | DIASTOLIC BLOOD PRESSURE: 88 MMHG | WEIGHT: 192 LBS | OXYGEN SATURATION: 98 % | SYSTOLIC BLOOD PRESSURE: 130 MMHG

## 2019-07-24 DIAGNOSIS — I10 ESSENTIAL HYPERTENSION: ICD-10-CM

## 2019-07-24 DIAGNOSIS — Z87.39 H/O: GOUT: ICD-10-CM

## 2019-07-24 DIAGNOSIS — R97.20 ELEVATED PSA: ICD-10-CM

## 2019-07-24 DIAGNOSIS — E78.2 MIXED HYPERLIPIDEMIA: ICD-10-CM

## 2019-07-24 DIAGNOSIS — E11.8 TYPE 2 DIABETES MELLITUS WITH COMPLICATION, WITHOUT LONG-TERM CURRENT USE OF INSULIN (HCC): Primary | ICD-10-CM

## 2019-07-24 LAB
CHP ED QC CHECK: NORMAL
GLUCOSE BLD-MCNC: 118 MG/DL

## 2019-07-24 PROCEDURE — 2022F DILAT RTA XM EVC RTNOPTHY: CPT | Performed by: INTERNAL MEDICINE

## 2019-07-24 PROCEDURE — G8427 DOCREV CUR MEDS BY ELIG CLIN: HCPCS | Performed by: INTERNAL MEDICINE

## 2019-07-24 PROCEDURE — 82962 GLUCOSE BLOOD TEST: CPT | Performed by: INTERNAL MEDICINE

## 2019-07-24 PROCEDURE — 3017F COLORECTAL CA SCREEN DOC REV: CPT | Performed by: INTERNAL MEDICINE

## 2019-07-24 PROCEDURE — 4004F PT TOBACCO SCREEN RCVD TLK: CPT | Performed by: INTERNAL MEDICINE

## 2019-07-24 PROCEDURE — 99214 OFFICE O/P EST MOD 30 MIN: CPT | Performed by: INTERNAL MEDICINE

## 2019-07-24 PROCEDURE — G8417 CALC BMI ABV UP PARAM F/U: HCPCS | Performed by: INTERNAL MEDICINE

## 2019-07-24 PROCEDURE — 3044F HG A1C LEVEL LT 7.0%: CPT | Performed by: INTERNAL MEDICINE

## 2019-07-24 ASSESSMENT — PATIENT HEALTH QUESTIONNAIRE - PHQ9
SUM OF ALL RESPONSES TO PHQ9 QUESTIONS 1 & 2: 0
SUM OF ALL RESPONSES TO PHQ QUESTIONS 1-9: 0
2. FEELING DOWN, DEPRESSED OR HOPELESS: 0
1. LITTLE INTEREST OR PLEASURE IN DOING THINGS: 0
SUM OF ALL RESPONSES TO PHQ QUESTIONS 1-9: 0

## 2019-07-25 RX ORDER — COLCHICINE 0.6 MG/1
CAPSULE ORAL
Qty: 28 CAPSULE | Refills: 0 | COMMUNITY
Start: 2019-07-25

## 2019-08-12 ASSESSMENT — ENCOUNTER SYMPTOMS
GASTROINTESTINAL NEGATIVE: 1
EYES NEGATIVE: 1
RESPIRATORY NEGATIVE: 1

## 2019-10-24 ENCOUNTER — OFFICE VISIT (OUTPATIENT)
Dept: INTERNAL MEDICINE CLINIC | Age: 59
End: 2019-10-24
Payer: MEDICARE

## 2019-10-24 VITALS
HEIGHT: 68 IN | WEIGHT: 192.4 LBS | HEART RATE: 87 BPM | SYSTOLIC BLOOD PRESSURE: 136 MMHG | DIASTOLIC BLOOD PRESSURE: 70 MMHG | BODY MASS INDEX: 29.16 KG/M2 | OXYGEN SATURATION: 98 %

## 2019-10-24 DIAGNOSIS — E11.8 TYPE 2 DIABETES MELLITUS WITH COMPLICATION, WITHOUT LONG-TERM CURRENT USE OF INSULIN (HCC): Primary | ICD-10-CM

## 2019-10-24 DIAGNOSIS — E78.5 HYPERLIPIDEMIA, UNSPECIFIED HYPERLIPIDEMIA TYPE: ICD-10-CM

## 2019-10-24 DIAGNOSIS — I10 ESSENTIAL HYPERTENSION: ICD-10-CM

## 2019-10-24 DIAGNOSIS — Z87.39 HISTORY OF GOUT: ICD-10-CM

## 2019-10-24 DIAGNOSIS — M62.838 MUSCLE SPASM: ICD-10-CM

## 2019-10-24 LAB
CHP ED QC CHECK: NORMAL
GLUCOSE BLD-MCNC: 123 MG/DL
HBA1C MFR BLD: 5.9 %

## 2019-10-24 PROCEDURE — 2022F DILAT RTA XM EVC RTNOPTHY: CPT | Performed by: INTERNAL MEDICINE

## 2019-10-24 PROCEDURE — 4004F PT TOBACCO SCREEN RCVD TLK: CPT | Performed by: INTERNAL MEDICINE

## 2019-10-24 PROCEDURE — G8484 FLU IMMUNIZE NO ADMIN: HCPCS | Performed by: INTERNAL MEDICINE

## 2019-10-24 PROCEDURE — G8427 DOCREV CUR MEDS BY ELIG CLIN: HCPCS | Performed by: INTERNAL MEDICINE

## 2019-10-24 PROCEDURE — 3017F COLORECTAL CA SCREEN DOC REV: CPT | Performed by: INTERNAL MEDICINE

## 2019-10-24 PROCEDURE — 83036 HEMOGLOBIN GLYCOSYLATED A1C: CPT | Performed by: INTERNAL MEDICINE

## 2019-10-24 PROCEDURE — 82962 GLUCOSE BLOOD TEST: CPT | Performed by: INTERNAL MEDICINE

## 2019-10-24 PROCEDURE — 3044F HG A1C LEVEL LT 7.0%: CPT | Performed by: INTERNAL MEDICINE

## 2019-10-24 PROCEDURE — G8417 CALC BMI ABV UP PARAM F/U: HCPCS | Performed by: INTERNAL MEDICINE

## 2019-10-24 PROCEDURE — 99214 OFFICE O/P EST MOD 30 MIN: CPT | Performed by: INTERNAL MEDICINE

## 2019-10-24 RX ORDER — ALLOPURINOL 300 MG/1
300 TABLET ORAL DAILY
Qty: 90 TABLET | Refills: 2 | Status: SHIPPED | OUTPATIENT
Start: 2019-10-24 | End: 2020-08-06 | Stop reason: SDUPTHER

## 2019-10-24 RX ORDER — METHOCARBAMOL 500 MG/1
500 TABLET, FILM COATED ORAL 2 TIMES DAILY PRN
Qty: 60 TABLET | Refills: 1 | Status: SHIPPED | OUTPATIENT
Start: 2019-10-24 | End: 2020-02-20

## 2019-10-24 RX ORDER — ATORVASTATIN CALCIUM 40 MG/1
40 TABLET, FILM COATED ORAL DAILY
Qty: 90 TABLET | Refills: 2 | Status: SHIPPED | OUTPATIENT
Start: 2019-10-24 | End: 2020-07-14

## 2020-01-22 RX ORDER — AMLODIPINE BESYLATE 5 MG/1
5 TABLET ORAL DAILY
Qty: 90 TABLET | Refills: 2 | Status: SHIPPED | OUTPATIENT
Start: 2020-01-22 | End: 2020-08-06 | Stop reason: SDUPTHER

## 2020-01-24 ENCOUNTER — TELEPHONE (OUTPATIENT)
Dept: INTERNAL MEDICINE CLINIC | Age: 60
End: 2020-01-24

## 2020-01-24 RX ORDER — GABAPENTIN 100 MG/1
100 CAPSULE ORAL 3 TIMES DAILY PRN
Qty: 90 CAPSULE | Refills: 1 | Status: SHIPPED | OUTPATIENT
Start: 2020-01-24 | End: 2020-03-18

## 2020-01-30 ENCOUNTER — OFFICE VISIT (OUTPATIENT)
Dept: INTERNAL MEDICINE CLINIC | Age: 60
End: 2020-01-30
Payer: MEDICARE

## 2020-01-30 VITALS
OXYGEN SATURATION: 98 % | WEIGHT: 190 LBS | BODY MASS INDEX: 29.82 KG/M2 | SYSTOLIC BLOOD PRESSURE: 112 MMHG | DIASTOLIC BLOOD PRESSURE: 78 MMHG | HEIGHT: 67 IN | HEART RATE: 68 BPM

## 2020-01-30 DIAGNOSIS — Z11.59 NEED FOR HEPATITIS C SCREENING TEST: ICD-10-CM

## 2020-01-30 DIAGNOSIS — Z11.4 ENCOUNTER FOR SCREENING FOR HIV: ICD-10-CM

## 2020-01-30 DIAGNOSIS — Z87.39 H/O: GOUT: ICD-10-CM

## 2020-01-30 DIAGNOSIS — E78.2 MIXED HYPERLIPIDEMIA: ICD-10-CM

## 2020-01-30 DIAGNOSIS — I10 ESSENTIAL HYPERTENSION: ICD-10-CM

## 2020-01-30 LAB
A/G RATIO: 1.5 (ref 1.1–2.2)
ALBUMIN SERPL-MCNC: 4.6 G/DL (ref 3.4–5)
ALP BLD-CCNC: 142 U/L (ref 40–129)
ALT SERPL-CCNC: 10 U/L (ref 10–40)
ANION GAP SERPL CALCULATED.3IONS-SCNC: 12 MMOL/L (ref 3–16)
AST SERPL-CCNC: 13 U/L (ref 15–37)
BILIRUB SERPL-MCNC: 0.3 MG/DL (ref 0–1)
BUN BLDV-MCNC: 8 MG/DL (ref 7–20)
CALCIUM SERPL-MCNC: 9.8 MG/DL (ref 8.3–10.6)
CHLORIDE BLD-SCNC: 102 MMOL/L (ref 99–110)
CHOLESTEROL, TOTAL: 161 MG/DL (ref 0–199)
CHP ED QC CHECK: NORMAL
CO2: 26 MMOL/L (ref 21–32)
CREAT SERPL-MCNC: 0.8 MG/DL (ref 0.8–1.3)
CREATININE URINE: 167.4 MG/DL (ref 39–259)
GFR AFRICAN AMERICAN: >60
GFR NON-AFRICAN AMERICAN: >60
GLOBULIN: 3 G/DL
GLUCOSE BLD-MCNC: 107 MG/DL (ref 70–99)
GLUCOSE BLD-MCNC: 98 MG/DL
HBA1C MFR BLD: 5.8 %
HCT VFR BLD CALC: 43.5 % (ref 40.5–52.5)
HDLC SERPL-MCNC: 33 MG/DL (ref 40–60)
HEMOGLOBIN: 14 G/DL (ref 13.5–17.5)
HEPATITIS C ANTIBODY INTERPRETATION: NORMAL
LDL CHOLESTEROL CALCULATED: 106 MG/DL
MCH RBC QN AUTO: 29.3 PG (ref 26–34)
MCHC RBC AUTO-ENTMCNC: 32.3 G/DL (ref 31–36)
MCV RBC AUTO: 90.8 FL (ref 80–100)
MICROALBUMIN UR-MCNC: <1.2 MG/DL
MICROALBUMIN/CREAT UR-RTO: NORMAL MG/G (ref 0–30)
PDW BLD-RTO: 16.7 % (ref 12.4–15.4)
PLATELET # BLD: 265 K/UL (ref 135–450)
PMV BLD AUTO: 8 FL (ref 5–10.5)
POTASSIUM SERPL-SCNC: 4.5 MMOL/L (ref 3.5–5.1)
RBC # BLD: 4.79 M/UL (ref 4.2–5.9)
SODIUM BLD-SCNC: 140 MMOL/L (ref 136–145)
TOTAL PROTEIN: 7.6 G/DL (ref 6.4–8.2)
TRIGL SERPL-MCNC: 111 MG/DL (ref 0–150)
URIC ACID, SERUM: 2.9 MG/DL (ref 3.5–7.2)
VLDLC SERPL CALC-MCNC: 22 MG/DL
WBC # BLD: 7.9 K/UL (ref 4–11)

## 2020-01-30 PROCEDURE — G8427 DOCREV CUR MEDS BY ELIG CLIN: HCPCS | Performed by: INTERNAL MEDICINE

## 2020-01-30 PROCEDURE — 99214 OFFICE O/P EST MOD 30 MIN: CPT | Performed by: INTERNAL MEDICINE

## 2020-01-30 PROCEDURE — 4004F PT TOBACCO SCREEN RCVD TLK: CPT | Performed by: INTERNAL MEDICINE

## 2020-01-30 PROCEDURE — 82962 GLUCOSE BLOOD TEST: CPT | Performed by: INTERNAL MEDICINE

## 2020-01-30 PROCEDURE — 83036 HEMOGLOBIN GLYCOSYLATED A1C: CPT | Performed by: INTERNAL MEDICINE

## 2020-01-30 PROCEDURE — G8417 CALC BMI ABV UP PARAM F/U: HCPCS | Performed by: INTERNAL MEDICINE

## 2020-01-30 PROCEDURE — 3044F HG A1C LEVEL LT 7.0%: CPT | Performed by: INTERNAL MEDICINE

## 2020-01-30 PROCEDURE — 3017F COLORECTAL CA SCREEN DOC REV: CPT | Performed by: INTERNAL MEDICINE

## 2020-01-30 PROCEDURE — 2022F DILAT RTA XM EVC RTNOPTHY: CPT | Performed by: INTERNAL MEDICINE

## 2020-01-30 PROCEDURE — G8484 FLU IMMUNIZE NO ADMIN: HCPCS | Performed by: INTERNAL MEDICINE

## 2020-01-30 ASSESSMENT — PATIENT HEALTH QUESTIONNAIRE - PHQ9
SUM OF ALL RESPONSES TO PHQ9 QUESTIONS 1 & 2: 0
2. FEELING DOWN, DEPRESSED OR HOPELESS: 0
1. LITTLE INTEREST OR PLEASURE IN DOING THINGS: 0
SUM OF ALL RESPONSES TO PHQ QUESTIONS 1-9: 0
SUM OF ALL RESPONSES TO PHQ QUESTIONS 1-9: 0

## 2020-01-31 LAB
HIV AG/AB: NORMAL
HIV ANTIGEN: NORMAL
HIV-1 ANTIBODY: NORMAL
HIV-2 AB: NORMAL

## 2020-02-20 RX ORDER — METHOCARBAMOL 500 MG/1
500 TABLET, FILM COATED ORAL 2 TIMES DAILY PRN
Qty: 60 TABLET | Refills: 1 | Status: SHIPPED | OUTPATIENT
Start: 2020-02-20 | End: 2020-04-29

## 2020-03-18 RX ORDER — GABAPENTIN 100 MG/1
100 CAPSULE ORAL 3 TIMES DAILY PRN
Qty: 90 CAPSULE | Refills: 1 | Status: SHIPPED | OUTPATIENT
Start: 2020-03-18 | End: 2020-05-13

## 2020-04-29 RX ORDER — METHOCARBAMOL 500 MG/1
500 TABLET, FILM COATED ORAL 2 TIMES DAILY PRN
Qty: 60 TABLET | Refills: 1 | Status: SHIPPED | OUTPATIENT
Start: 2020-04-29 | End: 2020-06-09

## 2020-05-13 RX ORDER — GABAPENTIN 100 MG/1
100 CAPSULE ORAL 3 TIMES DAILY PRN
Qty: 90 CAPSULE | Refills: 1 | Status: SHIPPED | OUTPATIENT
Start: 2020-05-13 | End: 2020-07-09

## 2020-06-09 RX ORDER — METHOCARBAMOL 500 MG/1
500 TABLET, FILM COATED ORAL 2 TIMES DAILY PRN
Qty: 60 TABLET | Refills: 1 | Status: SHIPPED | OUTPATIENT
Start: 2020-06-09 | End: 2021-01-22

## 2020-07-09 RX ORDER — GABAPENTIN 100 MG/1
100 CAPSULE ORAL 3 TIMES DAILY PRN
Qty: 90 CAPSULE | Refills: 1 | Status: SHIPPED | OUTPATIENT
Start: 2020-07-09 | End: 2020-09-01

## 2020-07-13 ENCOUNTER — TELEPHONE (OUTPATIENT)
Dept: INTERNAL MEDICINE CLINIC | Age: 60
End: 2020-07-13

## 2020-07-13 NOTE — TELEPHONE ENCOUNTER
Patient has gas and chest pains. Can not do a virtual visit. Offered appt with nurse practioner in [place] but refused. Please let him know what else can be done.

## 2020-07-14 RX ORDER — ATORVASTATIN CALCIUM 40 MG/1
TABLET, FILM COATED ORAL
Qty: 90 TABLET | Refills: 2 | Status: SHIPPED | OUTPATIENT
Start: 2020-07-14 | End: 2021-04-27

## 2020-07-15 ENCOUNTER — OFFICE VISIT (OUTPATIENT)
Dept: INTERNAL MEDICINE CLINIC | Age: 60
End: 2020-07-15
Payer: MEDICARE

## 2020-07-15 LAB
CHP ED QC CHECK: NORMAL
GLUCOSE BLD-MCNC: 114 MG/DL
HBA1C MFR BLD: 6.1 %

## 2020-07-15 PROCEDURE — 83036 HEMOGLOBIN GLYCOSYLATED A1C: CPT | Performed by: INTERNAL MEDICINE

## 2020-07-15 PROCEDURE — 82962 GLUCOSE BLOOD TEST: CPT | Performed by: INTERNAL MEDICINE

## 2020-07-15 PROCEDURE — 4004F PT TOBACCO SCREEN RCVD TLK: CPT | Performed by: INTERNAL MEDICINE

## 2020-07-15 PROCEDURE — 99214 OFFICE O/P EST MOD 30 MIN: CPT | Performed by: INTERNAL MEDICINE

## 2020-07-15 PROCEDURE — 3017F COLORECTAL CA SCREEN DOC REV: CPT | Performed by: INTERNAL MEDICINE

## 2020-07-15 PROCEDURE — 2022F DILAT RTA XM EVC RTNOPTHY: CPT | Performed by: INTERNAL MEDICINE

## 2020-07-15 PROCEDURE — 3044F HG A1C LEVEL LT 7.0%: CPT | Performed by: INTERNAL MEDICINE

## 2020-07-15 PROCEDURE — G8427 DOCREV CUR MEDS BY ELIG CLIN: HCPCS | Performed by: INTERNAL MEDICINE

## 2020-07-15 PROCEDURE — G8417 CALC BMI ABV UP PARAM F/U: HCPCS | Performed by: INTERNAL MEDICINE

## 2020-07-15 NOTE — PROGRESS NOTES
Subjective:      Patient ID: Keely Caldwell is a 56 y.o. male. HPI He has not been feeling well. No appetite for > 1 month. No nausea or vomiting. Bowel movements every 2 to 3 days since appetite decreased. Seems like bowels dont want to move. Ate some greens which seem to help temporarily. Has seen some blood when wiping, not in toilet. Has not had colonoscopy. Review of Systems   Constitutional: Positive for activity change, appetite change and unexpected weight change. Negative for chills and fever. Weight loss. HENT: Negative. Eyes: Negative. Respiratory: Negative. Cardiovascular:        HTN, takes prescribed medication. Treaed with Ca blker. Gastrointestinal: Positive for abdominal distention, blood in stool and constipation. Negative for abdominal pain, diarrhea, nausea and rectal pain. Endocrine:        Diabetes, on metformin. See list. A1c 6.1, A1c 105. Elevated TSH noted in the past.    Genitourinary: Negative. Musculoskeletal: Positive for arthralgias. Skin: Negative. Neurological: Negative. Psychiatric/Behavioral: Negative. Objective:   Physical Exam  Constitutional:       General: He is not in acute distress. Appearance: He is well-developed. HENT:      Head: Normocephalic and atraumatic. Right Ear: External ear normal.      Left Ear: External ear normal.      Nose: Nose normal.   Eyes:      General: No scleral icterus. Conjunctiva/sclera: Conjunctivae normal.      Pupils: Pupils are equal, round, and reactive to light. Neck:      Musculoskeletal: Normal range of motion and neck supple. Thyroid: No thyromegaly. Cardiovascular:      Rate and Rhythm: Normal rate and regular rhythm. Heart sounds: Normal heart sounds. Pulmonary:      Effort: Pulmonary effort is normal.      Breath sounds: Normal breath sounds. Abdominal:      General: Bowel sounds are normal.      Palpations: Abdomen is soft. There is no mass. Tenderness: There is no guarding or rebound. Comments: Distended. Left mid abdominal tenderness to palpation, mild. Musculoskeletal:      Comments: No joint inflammation. Lymphadenopathy:      Cervical: No cervical adenopathy. Skin:     General: Skin is warm and dry. Neurological:      Mental Status: He is alert and oriented to person, place, and time. Deep Tendon Reflexes: Reflexes are normal and symmetric. Psychiatric:         Behavior: Behavior normal.         Thought Content: Thought content normal.         Judgment: Judgment normal.         Assessment:        Diagnosis Orders   1. Type 2 diabetes mellitus with complication, without long-term current use of insulin ([facility])  POCT Glucose  Diet and med compliance stressed. POCT glycosylated hemoglobin (Hb A1C)   2. Other constipation  Weight loss, blood in stool, abdominal pain. Needs upper and lower endoscopy. Lara May MD, Gastroenterology, [facility]   3. Weight loss  [facility]  As above. TSH with Reflex   4. Blood in stool  [facility]   5. Generalized abdominal pain  LIPASE    COMPREHENSIVE METABOLIC PANEL   6. TSH (thyroid-stimulating hormone deficiency)  Rule out hypothyroidism. 7. Essential hypertension  Continue same med regimen. DASH diet discussed. CBC WITH AUTO DIFFERENTIAL    MICROALBUMIN / CREATININE URINE RATIO   8. Arthralgia, unspecified joint  SEDIMENTATION RATE   9. Mixed hyperlipidemia  Heart healthy diet and statin compliance discussed. Plan:    See plans above.         Brooklyn Gill MD

## 2020-08-06 ENCOUNTER — OFFICE VISIT (OUTPATIENT)
Dept: INTERNAL MEDICINE CLINIC | Age: 60
End: 2020-08-06
Payer: MEDICARE

## 2020-08-06 VITALS
HEART RATE: 72 BPM | DIASTOLIC BLOOD PRESSURE: 78 MMHG | WEIGHT: 181 LBS | SYSTOLIC BLOOD PRESSURE: 134 MMHG | TEMPERATURE: 97.7 F | BODY MASS INDEX: 28.41 KG/M2 | HEIGHT: 67 IN | OXYGEN SATURATION: 98 %

## 2020-08-06 LAB
CHP ED QC CHECK: NORMAL
GLUCOSE BLD-MCNC: 105 MG/DL

## 2020-08-06 PROCEDURE — 82962 GLUCOSE BLOOD TEST: CPT | Performed by: INTERNAL MEDICINE

## 2020-08-06 PROCEDURE — G8417 CALC BMI ABV UP PARAM F/U: HCPCS | Performed by: INTERNAL MEDICINE

## 2020-08-06 PROCEDURE — 2022F DILAT RTA XM EVC RTNOPTHY: CPT | Performed by: INTERNAL MEDICINE

## 2020-08-06 PROCEDURE — G8427 DOCREV CUR MEDS BY ELIG CLIN: HCPCS | Performed by: INTERNAL MEDICINE

## 2020-08-06 PROCEDURE — 4004F PT TOBACCO SCREEN RCVD TLK: CPT | Performed by: INTERNAL MEDICINE

## 2020-08-06 PROCEDURE — 3017F COLORECTAL CA SCREEN DOC REV: CPT | Performed by: INTERNAL MEDICINE

## 2020-08-06 PROCEDURE — 99214 OFFICE O/P EST MOD 30 MIN: CPT | Performed by: INTERNAL MEDICINE

## 2020-08-06 PROCEDURE — 3044F HG A1C LEVEL LT 7.0%: CPT | Performed by: INTERNAL MEDICINE

## 2020-08-06 RX ORDER — ALLOPURINOL 300 MG/1
300 TABLET ORAL DAILY
Qty: 90 TABLET | Refills: 2 | Status: SHIPPED | OUTPATIENT
Start: 2020-08-06 | End: 2021-04-23

## 2020-08-06 RX ORDER — AMLODIPINE BESYLATE 5 MG/1
5 TABLET ORAL DAILY
Qty: 90 TABLET | Refills: 2 | Status: CANCELLED | OUTPATIENT
Start: 2020-08-06 | End: 2020-09-05

## 2020-08-06 RX ORDER — AMLODIPINE BESYLATE 5 MG/1
5 TABLET ORAL DAILY
Qty: 90 TABLET | Refills: 2 | Status: SHIPPED | OUTPATIENT
Start: 2020-08-06 | End: 2021-04-23

## 2020-08-06 RX ORDER — ALLOPURINOL 300 MG/1
300 TABLET ORAL DAILY
Qty: 90 TABLET | Refills: 2 | Status: CANCELLED | OUTPATIENT
Start: 2020-08-06

## 2020-08-06 NOTE — PROGRESS NOTES
Subjective:      Patient ID: Zuly Payne is a 56 y.o. male. HPI He is here for f/u exam. Recent  colonoscopy revealed 10 sigmoid polyps ranging from 5 to 10 mm in size. All removed. He feels better. Appetite improved. Has gained weight. 9 pounds since last visit. Review of Systems   Constitutional:        Weight loss resolved. HENT: Negative. Eyes: Negative. Respiratory: Negative. Cardiovascular:        HTN, stable. Gastrointestinal: Negative for nausea. No difficulty swallowing. No painful swallowing. Endocrine:        Diabetes, type 2, FBS, 105. A1c 6.1. Genitourinary: Negative. Musculoskeletal:        H/O gout. No flare ups. Needs allopurinol refilled. Skin: Negative. Neurological: Negative. Psychiatric/Behavioral: Negative. Objective:   Physical Exam  Constitutional:       General: He is not in acute distress. Appearance: He is well-developed. HENT:      Head: Normocephalic and atraumatic. Right Ear: External ear normal.      Left Ear: External ear normal.      Nose: Nose normal.   Eyes:      General: No scleral icterus. Conjunctiva/sclera: Conjunctivae normal.      Pupils: Pupils are equal, round, and reactive to light. Neck:      Musculoskeletal: Normal range of motion and neck supple. Thyroid: No thyromegaly. Cardiovascular:      Rate and Rhythm: Normal rate and regular rhythm. Heart sounds: Normal heart sounds. Pulmonary:      Effort: Pulmonary effort is normal.      Breath sounds: Normal breath sounds. Abdominal:      General: Bowel sounds are normal.      Palpations: Abdomen is soft. There is no mass. Lymphadenopathy:      Cervical: No cervical adenopathy. Skin:     General: Skin is warm and dry. Neurological:      Mental Status: He is alert and oriented to person, place, and time. Deep Tendon Reflexes: Reflexes are normal and symmetric.    Psychiatric:         Behavior: Behavior normal. Thought Content: Thought content normal.         Judgment: Judgment normal.         Assessment:        Diagnosis Orders   1. History of colon polyps  Path report pending. 2. History of gout  allopurinol (ZYLOPRIM) 300 MG tablet  Diet and med compliance discussed. 3. Essential hypertension  amLODIPine (NORVASC) 5 MG tablet  Heart healthy diet and statin compliance discussed. 4. Type 2 diabetes mellitus with complication, without long-term current use of insulin (HCC)  POCT Glucose  Diet, physical activity and med compliance discussed. 5. Weight loss  Resolved. Appetite good. No swallowing issues. Healthy meal planning advised. Plan:    See plans above.          Kenya Marinelli MD

## 2020-08-15 VITALS
TEMPERATURE: 97.7 F | BODY MASS INDEX: 27.06 KG/M2 | OXYGEN SATURATION: 97 % | DIASTOLIC BLOOD PRESSURE: 88 MMHG | WEIGHT: 172.4 LBS | HEART RATE: 81 BPM | HEIGHT: 67 IN | SYSTOLIC BLOOD PRESSURE: 143 MMHG

## 2020-08-15 ASSESSMENT — ENCOUNTER SYMPTOMS
NAUSEA: 0
EYES NEGATIVE: 1
RESPIRATORY NEGATIVE: 1
RECTAL PAIN: 0
ABDOMINAL DISTENTION: 1
DIARRHEA: 0
ABDOMINAL PAIN: 0
BLOOD IN STOOL: 1
CONSTIPATION: 1

## 2020-08-27 ASSESSMENT — ENCOUNTER SYMPTOMS
NAUSEA: 0
EYES NEGATIVE: 1
RESPIRATORY NEGATIVE: 1

## 2020-09-01 RX ORDER — GABAPENTIN 100 MG/1
100 CAPSULE ORAL 3 TIMES DAILY PRN
Qty: 90 CAPSULE | Refills: 1 | Status: SHIPPED | OUTPATIENT
Start: 2020-09-01 | End: 2020-11-09

## 2020-11-05 ENCOUNTER — TELEPHONE (OUTPATIENT)
Dept: INTERNAL MEDICINE CLINIC | Age: 60
End: 2020-11-05

## 2020-11-05 NOTE — TELEPHONE ENCOUNTER
Left message to see if patient would like to reschedule the appt that was missed today. Please call the office to reschedule appt.

## 2020-11-09 RX ORDER — GABAPENTIN 100 MG/1
100 CAPSULE ORAL 3 TIMES DAILY PRN
Qty: 90 CAPSULE | Refills: 1 | Status: SHIPPED | OUTPATIENT
Start: 2020-11-09 | End: 2022-05-11 | Stop reason: SDUPTHER

## 2020-11-18 ENCOUNTER — OFFICE VISIT (OUTPATIENT)
Dept: INTERNAL MEDICINE CLINIC | Age: 60
End: 2020-11-18
Payer: MEDICARE

## 2020-11-18 VITALS
WEIGHT: 177.2 LBS | OXYGEN SATURATION: 99 % | TEMPERATURE: 97.5 F | DIASTOLIC BLOOD PRESSURE: 88 MMHG | HEART RATE: 72 BPM | BODY MASS INDEX: 27.81 KG/M2 | SYSTOLIC BLOOD PRESSURE: 132 MMHG | HEIGHT: 67 IN

## 2020-11-18 LAB
CHP ED QC CHECK: NORMAL
GLUCOSE BLD-MCNC: 103 MG/DL
HBA1C MFR BLD: 5.8 %

## 2020-11-18 PROCEDURE — 82962 GLUCOSE BLOOD TEST: CPT | Performed by: INTERNAL MEDICINE

## 2020-11-18 PROCEDURE — 4004F PT TOBACCO SCREEN RCVD TLK: CPT | Performed by: INTERNAL MEDICINE

## 2020-11-18 PROCEDURE — G8417 CALC BMI ABV UP PARAM F/U: HCPCS | Performed by: INTERNAL MEDICINE

## 2020-11-18 PROCEDURE — G8427 DOCREV CUR MEDS BY ELIG CLIN: HCPCS | Performed by: INTERNAL MEDICINE

## 2020-11-18 PROCEDURE — 2022F DILAT RTA XM EVC RTNOPTHY: CPT | Performed by: INTERNAL MEDICINE

## 2020-11-18 PROCEDURE — 90686 IIV4 VACC NO PRSV 0.5 ML IM: CPT | Performed by: INTERNAL MEDICINE

## 2020-11-18 PROCEDURE — 3017F COLORECTAL CA SCREEN DOC REV: CPT | Performed by: INTERNAL MEDICINE

## 2020-11-18 PROCEDURE — 83036 HEMOGLOBIN GLYCOSYLATED A1C: CPT | Performed by: INTERNAL MEDICINE

## 2020-11-18 PROCEDURE — 3044F HG A1C LEVEL LT 7.0%: CPT | Performed by: INTERNAL MEDICINE

## 2020-11-18 PROCEDURE — 99214 OFFICE O/P EST MOD 30 MIN: CPT | Performed by: INTERNAL MEDICINE

## 2020-11-18 PROCEDURE — G0008 ADMIN INFLUENZA VIRUS VAC: HCPCS | Performed by: INTERNAL MEDICINE

## 2020-11-18 PROCEDURE — G8482 FLU IMMUNIZE ORDER/ADMIN: HCPCS | Performed by: INTERNAL MEDICINE

## 2020-11-18 NOTE — PROGRESS NOTES
Subjective:      Patient ID: Esther Becerra is a 56 y.o. male. HPI He is here for a check up and f/u on HTN, DM, HLD. He is taking medication as prescribed, continues to work on balanced meals and more physical activity. Continues to smoke cigarettes, despite repeated counseling. Review of Systems   Constitutional: Negative. HENT: Negative. Eyes: Negative. Respiratory: Negative. Cardiovascular:   HTN, on stable med regimen. See list.   Gastrointestinal: Negative. Endocrine:   Diabetes, type 2, treated with metformin. Last a1c was 5.8. Hyperlipidemia, treated with statin. . Genitourinary: Negative. Musculoskeletal: Negative. Skin: Negative. Neurological: Negative. Psychiatric/Behavioral: Negative. Objective:   Physical Exam   Constitutional: He is oriented to person, place, and time. He appears well-developed and well-nourished. No distress. HENT:   Head: Normocephalic and atraumatic. Right Ear: External ear normal.   Left Ear: External ear normal.   Nose: Nose normal.   Mouth/Throat: Oropharynx is clear and moist.   Eyes: Pupils are equal, round, and reactive to light. Conjunctivae and EOM are normal. No scleral icterus. Neck: Normal range of motion. Neck supple. No thyromegaly present. Cardiovascular: Normal rate, regular rhythm, normal heart sounds and intact distal pulses. Pulmonary/Chest: Effort normal and breath sounds normal.   Abdominal: Soft. Bowel sounds are normal. He exhibits no mass. Musculoskeletal:     Feet, mild fungal nail changes. DP pulse ok. Vibration sense intact. Lymphadenopathy:     He has no cervical adenopathy. Neurological: He is alert and oriented to person, place, and time. He has normal reflexes. Skin: Skin is warm and dry. Psychiatric: He has a normal mood and affect.  His behavior is normal. Judgment and thought content normal.       Assessment:        Diagnosis Orders

## 2021-01-22 DIAGNOSIS — M62.838 MUSCLE SPASM: ICD-10-CM

## 2021-01-22 RX ORDER — METHOCARBAMOL 500 MG/1
TABLET, FILM COATED ORAL
Qty: 60 TABLET | Refills: 1 | Status: SHIPPED | OUTPATIENT
Start: 2021-01-22 | End: 2021-05-18 | Stop reason: SDUPTHER

## 2021-02-17 ENCOUNTER — OFFICE VISIT (OUTPATIENT)
Dept: INTERNAL MEDICINE CLINIC | Age: 61
End: 2021-02-17
Payer: MEDICARE

## 2021-02-17 VITALS
HEART RATE: 71 BPM | HEIGHT: 67 IN | SYSTOLIC BLOOD PRESSURE: 126 MMHG | WEIGHT: 194 LBS | DIASTOLIC BLOOD PRESSURE: 84 MMHG | BODY MASS INDEX: 30.45 KG/M2 | OXYGEN SATURATION: 97 %

## 2021-02-17 DIAGNOSIS — E11.8 TYPE 2 DIABETES MELLITUS WITH COMPLICATION, WITHOUT LONG-TERM CURRENT USE OF INSULIN (HCC): ICD-10-CM

## 2021-02-17 DIAGNOSIS — K92.1 BLOOD IN STOOL: Primary | ICD-10-CM

## 2021-02-17 DIAGNOSIS — I10 ESSENTIAL HYPERTENSION: ICD-10-CM

## 2021-02-17 LAB
CHP ED QC CHECK: NORMAL
GLUCOSE BLD-MCNC: 98 MG/DL
HBA1C MFR BLD: 5.5 %

## 2021-02-17 PROCEDURE — G8482 FLU IMMUNIZE ORDER/ADMIN: HCPCS | Performed by: INTERNAL MEDICINE

## 2021-02-17 PROCEDURE — 3017F COLORECTAL CA SCREEN DOC REV: CPT | Performed by: INTERNAL MEDICINE

## 2021-02-17 PROCEDURE — 2022F DILAT RTA XM EVC RTNOPTHY: CPT | Performed by: INTERNAL MEDICINE

## 2021-02-17 PROCEDURE — G8417 CALC BMI ABV UP PARAM F/U: HCPCS | Performed by: INTERNAL MEDICINE

## 2021-02-17 PROCEDURE — 82962 GLUCOSE BLOOD TEST: CPT | Performed by: INTERNAL MEDICINE

## 2021-02-17 PROCEDURE — 3044F HG A1C LEVEL LT 7.0%: CPT | Performed by: INTERNAL MEDICINE

## 2021-02-17 PROCEDURE — 4004F PT TOBACCO SCREEN RCVD TLK: CPT | Performed by: INTERNAL MEDICINE

## 2021-02-17 PROCEDURE — G8427 DOCREV CUR MEDS BY ELIG CLIN: HCPCS | Performed by: INTERNAL MEDICINE

## 2021-02-17 PROCEDURE — 99214 OFFICE O/P EST MOD 30 MIN: CPT | Performed by: INTERNAL MEDICINE

## 2021-02-17 PROCEDURE — 83036 HEMOGLOBIN GLYCOSYLATED A1C: CPT | Performed by: INTERNAL MEDICINE

## 2021-02-17 ASSESSMENT — PATIENT HEALTH QUESTIONNAIRE - PHQ9
SUM OF ALL RESPONSES TO PHQ QUESTIONS 1-9: 0
SUM OF ALL RESPONSES TO PHQ QUESTIONS 1-9: 0
SUM OF ALL RESPONSES TO PHQ9 QUESTIONS 1 & 2: 0
1. LITTLE INTEREST OR PLEASURE IN DOING THINGS: 0
SUM OF ALL RESPONSES TO PHQ QUESTIONS 1-9: 0
2. FEELING DOWN, DEPRESSED OR HOPELESS: 0

## 2021-02-17 NOTE — PROGRESS NOTES
Psychiatric/Behavioral: Negative. Physical Exam  Constitutional:       General: He is not in acute distress. Appearance: He is well-developed. HENT:      Head: Normocephalic and atraumatic. Right Ear: External ear normal.      Left Ear: External ear normal.      Nose: Nose normal.   Eyes:      General: No scleral icterus. Conjunctiva/sclera: Conjunctivae normal.      Pupils: Pupils are equal, round, and reactive to light. Neck:      Musculoskeletal: Normal range of motion and neck supple. Thyroid: No thyromegaly. Cardiovascular:      Rate and Rhythm: Normal rate and regular rhythm. Heart sounds: Normal heart sounds. Comments: No orthostatic blood pressure or pulse change. Pulmonary:      Effort: Pulmonary effort is normal.      Breath sounds: Normal breath sounds. Abdominal:      General: Bowel sounds are normal. There is no distension. Palpations: Abdomen is soft. There is no mass. Tenderness: There is no abdominal tenderness. There is no guarding. Lymphadenopathy:      Cervical: No cervical adenopathy. Skin:     General: Skin is warm and dry. Neurological:      Mental Status: He is alert and oriented to person, place, and time. Deep Tendon Reflexes: Reflexes are normal and symmetric. Psychiatric:         Behavior: Behavior normal.         Thought Content: Thought content normal.         Judgment: Judgment normal.         Vitals:    02/17/21 0953   BP: 126/84   Pulse: 71   SpO2: 97%       Assessment:   Diagnosis Orders   1. Blood in stool  Differential includes hemorrhoids, diverticular disease. External Referral To Gastroenterology   2. Type 2 diabetes mellitus with complication, without long-term current use of insulin (HCC)  Diet, physical activity and weight reduction discussed. POCT Glucose    POCT glycosylated hemoglobin (Hb A1C)   3. Essential hypertension  Continue same med regimen. DASH diet discussed.          Plan:  See plans

## 2021-02-18 ASSESSMENT — ENCOUNTER SYMPTOMS
EYES NEGATIVE: 1
BLOOD IN STOOL: 1
RESPIRATORY NEGATIVE: 1

## 2021-04-23 DIAGNOSIS — Z87.39 HISTORY OF GOUT: ICD-10-CM

## 2021-04-23 DIAGNOSIS — I10 ESSENTIAL HYPERTENSION: ICD-10-CM

## 2021-04-23 RX ORDER — AMLODIPINE BESYLATE 5 MG/1
TABLET ORAL
Qty: 90 TABLET | Refills: 2 | Status: SHIPPED | OUTPATIENT
Start: 2021-04-23 | End: 2021-08-25 | Stop reason: SDUPTHER

## 2021-04-23 RX ORDER — ALLOPURINOL 300 MG/1
300 TABLET ORAL DAILY
Qty: 90 TABLET | Refills: 2 | Status: SHIPPED | OUTPATIENT
Start: 2021-04-23 | End: 2021-10-15

## 2021-04-26 DIAGNOSIS — E78.5 HYPERLIPIDEMIA, UNSPECIFIED HYPERLIPIDEMIA TYPE: ICD-10-CM

## 2021-04-27 RX ORDER — ATORVASTATIN CALCIUM 40 MG/1
TABLET, FILM COATED ORAL
Qty: 90 TABLET | Refills: 2 | Status: SHIPPED | OUTPATIENT
Start: 2021-04-27 | End: 2021-05-18 | Stop reason: SDUPTHER

## 2021-05-18 ENCOUNTER — OFFICE VISIT (OUTPATIENT)
Dept: INTERNAL MEDICINE CLINIC | Age: 61
End: 2021-05-18
Payer: MEDICARE

## 2021-05-18 VITALS
DIASTOLIC BLOOD PRESSURE: 90 MMHG | BODY MASS INDEX: 28.56 KG/M2 | OXYGEN SATURATION: 99 % | WEIGHT: 182 LBS | SYSTOLIC BLOOD PRESSURE: 160 MMHG | HEART RATE: 67 BPM | HEIGHT: 67 IN

## 2021-05-18 DIAGNOSIS — F17.219 CIGARETTE NICOTINE DEPENDENCE WITH NICOTINE-INDUCED DISORDER: ICD-10-CM

## 2021-05-18 DIAGNOSIS — I73.9 PAD (PERIPHERAL ARTERY DISEASE) (HCC): ICD-10-CM

## 2021-05-18 DIAGNOSIS — I10 ESSENTIAL HYPERTENSION: ICD-10-CM

## 2021-05-18 DIAGNOSIS — E78.5 HYPERLIPIDEMIA, UNSPECIFIED HYPERLIPIDEMIA TYPE: ICD-10-CM

## 2021-05-18 DIAGNOSIS — E11.8 TYPE 2 DIABETES MELLITUS WITH COMPLICATION, WITHOUT LONG-TERM CURRENT USE OF INSULIN (HCC): Primary | ICD-10-CM

## 2021-05-18 DIAGNOSIS — M62.838 MUSCLE SPASM: ICD-10-CM

## 2021-05-18 LAB
CHP ED QC CHECK: NORMAL
GLUCOSE BLD-MCNC: 104 MG/DL
HBA1C MFR BLD: 5.7 %

## 2021-05-18 PROCEDURE — 4004F PT TOBACCO SCREEN RCVD TLK: CPT | Performed by: INTERNAL MEDICINE

## 2021-05-18 PROCEDURE — 83036 HEMOGLOBIN GLYCOSYLATED A1C: CPT | Performed by: INTERNAL MEDICINE

## 2021-05-18 PROCEDURE — 82962 GLUCOSE BLOOD TEST: CPT | Performed by: INTERNAL MEDICINE

## 2021-05-18 PROCEDURE — 99214 OFFICE O/P EST MOD 30 MIN: CPT | Performed by: INTERNAL MEDICINE

## 2021-05-18 PROCEDURE — 2022F DILAT RTA XM EVC RTNOPTHY: CPT | Performed by: INTERNAL MEDICINE

## 2021-05-18 PROCEDURE — 3044F HG A1C LEVEL LT 7.0%: CPT | Performed by: INTERNAL MEDICINE

## 2021-05-18 PROCEDURE — G8427 DOCREV CUR MEDS BY ELIG CLIN: HCPCS | Performed by: INTERNAL MEDICINE

## 2021-05-18 PROCEDURE — G8417 CALC BMI ABV UP PARAM F/U: HCPCS | Performed by: INTERNAL MEDICINE

## 2021-05-18 PROCEDURE — 3017F COLORECTAL CA SCREEN DOC REV: CPT | Performed by: INTERNAL MEDICINE

## 2021-05-18 RX ORDER — ATORVASTATIN CALCIUM 40 MG/1
TABLET, FILM COATED ORAL
Qty: 90 TABLET | Refills: 2 | Status: CANCELLED | OUTPATIENT
Start: 2021-05-18

## 2021-05-18 RX ORDER — LOSARTAN POTASSIUM 50 MG/1
50 TABLET ORAL DAILY
Qty: 30 TABLET | Refills: 5 | Status: SHIPPED | OUTPATIENT
Start: 2021-05-18 | End: 2021-09-07

## 2021-05-18 RX ORDER — METHOCARBAMOL 500 MG/1
500 TABLET, FILM COATED ORAL 2 TIMES DAILY PRN
Qty: 60 TABLET | Refills: 1 | Status: SHIPPED | OUTPATIENT
Start: 2021-05-18 | End: 2021-08-25 | Stop reason: SDUPTHER

## 2021-05-18 RX ORDER — ATORVASTATIN CALCIUM 40 MG/1
40 TABLET, FILM COATED ORAL DAILY
Qty: 90 TABLET | Refills: 3 | Status: SHIPPED | OUTPATIENT
Start: 2021-05-18 | End: 2022-08-16

## 2021-05-18 SDOH — ECONOMIC STABILITY: FOOD INSECURITY: WITHIN THE PAST 12 MONTHS, THE FOOD YOU BOUGHT JUST DIDN'T LAST AND YOU DIDN'T HAVE MONEY TO GET MORE.: NEVER TRUE

## 2021-05-18 SDOH — ECONOMIC STABILITY: FOOD INSECURITY: WITHIN THE PAST 12 MONTHS, YOU WORRIED THAT YOUR FOOD WOULD RUN OUT BEFORE YOU GOT MONEY TO BUY MORE.: NEVER TRUE

## 2021-05-18 ASSESSMENT — SOCIAL DETERMINANTS OF HEALTH (SDOH): HOW HARD IS IT FOR YOU TO PAY FOR THE VERY BASICS LIKE FOOD, HOUSING, MEDICAL CARE, AND HEATING?: NOT HARD AT ALL

## 2021-05-18 ASSESSMENT — PATIENT HEALTH QUESTIONNAIRE - PHQ9
SUM OF ALL RESPONSES TO PHQ QUESTIONS 1-9: 0
1. LITTLE INTEREST OR PLEASURE IN DOING THINGS: 0
2. FEELING DOWN, DEPRESSED OR HOPELESS: 0
SUM OF ALL RESPONSES TO PHQ9 QUESTIONS 1 & 2: 0

## 2021-05-18 NOTE — PROGRESS NOTES
Patient: Junior Mcgill is a 56 y.o. male who presents today with the following Chief Complaint(s):    Chief Complaint   Patient presents with    Diabetes    Hypertension    Other     Has been w/o atorvastatin for 3 weeks          HPIHe is here for a check up and f/u on hypertension, hyperlipidemia, and diabetes type 2. He is taking medication as prescribed, eats one main meal daily and walks for exercise. He continues to smoke cigarettes and plans to stop. Needs CT lung scan. Has > 35 pack years. .         Need credit for colonoscopy. Feet, decrease dp pulse. Ok otherwise. Arterial dopplers. Current Outpatient Medications   Medication Sig Dispense Refill    atorvastatin (LIPITOR) 40 MG tablet TAKE 1 TABLET BY MOUTH EVERY DAY 90 tablet 2    allopurinol (ZYLOPRIM) 300 MG tablet TAKE 1 TABLET BY MOUTH DAILY FOR GOUT 90 tablet 2    amLODIPine (NORVASC) 5 MG tablet TAKE 1 TABLET BY MOUTH EVERY DAY 90 tablet 2    metFORMIN (GLUCOPHAGE) 500 MG tablet TAKE 1 TABLET BY MOUTH DAILY (WITH BREAKFAST) FOR DIABETES. 90 tablet 2    methocarbamol (ROBAXIN) 500 MG tablet TAKE 1 TABLET BY MOUTH 2 TIMES DAILY AS NEEDED. 60 tablet 1    gabapentin (NEURONTIN) 100 MG capsule TAKE 1 CAPSULE BY MOUTH 3 TIMES DAILY AS NEEDED (NERVE PAIN.) FOR UP TO 30 DAYS. 90 capsule 1    colchicine (MITIGARE) 0.6 MG capsule Lot [id number] Exp 5/2020 28 capsule 0    aspirin 81 MG tablet Take 1 tablet by mouth daily 30 tablet 11     No current facility-administered medications for this visit. Patient's past medical history, surgical history, family history, medications,and allergies  were all reviewed and updated as appropriate today. Review of Systems   Constitutional: Negative. HENT: Negative. Eyes: Negative. Respiratory: Negative. Cardiovascular:        Hypertension, treated with amlodipine. Gastrointestinal:        Has had colonoscopy. Needs credit.     Endocrine:        Hyperlipidemia, treated with statin. . Diabetes, T2, treated with metformin, A1c 5.7. Genitourinary: Negative. Musculoskeletal:        Complains of muscle spasms at times. Skin: Negative. Neurological: Negative. Psychiatric/Behavioral: Negative. Physical Exam  Constitutional:       General: He is not in acute distress. Appearance: He is well-developed. HENT:      Head: Normocephalic and atraumatic. Right Ear: External ear normal.      Left Ear: External ear normal.      Nose: Nose normal.   Eyes:      General: No scleral icterus. Conjunctiva/sclera: Conjunctivae normal.      Pupils: Pupils are equal, round, and reactive to light. Neck:      Thyroid: No thyromegaly. Cardiovascular:      Rate and Rhythm: Normal rate and regular rhythm. Heart sounds: Normal heart sounds. Comments: DP pulses difficult to detect. Pulmonary:      Effort: Pulmonary effort is normal.      Breath sounds: Normal breath sounds. Abdominal:      General: Bowel sounds are normal.      Palpations: Abdomen is soft. There is no mass. Musculoskeletal:      Cervical back: Normal range of motion and neck supple. Lymphadenopathy:      Cervical: No cervical adenopathy. Skin:     General: Skin is warm and dry. Neurological:      Mental Status: He is alert and oriented to person, place, and time. Deep Tendon Reflexes: Reflexes are normal and symmetric. Psychiatric:         Behavior: Behavior normal.         Thought Content: Thought content normal.         Judgment: Judgment normal.         Vitals:    05/18/21 0923   BP: (!) 152/78   Pulse:    SpO2:        Assessment:   Diagnosis Orders   1. Type 2 diabetes mellitus with complication, without long-term current use of insulin (HCC)  Diet, physical activity and med compliance discussed. POCT Glucose    POCT glycosylated hemoglobin (Hb A1C)   2.  Hyperlipidemia, unspecified hyperlipidemia type  Heart healthy diet and statin compliance stressed. atorvastatin (LIPITOR) 40 MG tablet   3. Muscle spasm  Stretching exercises given. methocarbamol (ROBAXIN) 500 MG tablet   4. Cigarette nicotine dependence with nicotine-induced disorder  CT Lung Screen (Initial or Annual)   5. PAD (peripheral artery disease) (HCC)  VL DUP LOWER EXTREMITY ARTERIES BILATERAL   6. Essential hypertension  losartan (COZAAR) 50 MG tablet  DASH diet discussed. Plan:  See plans above.

## 2021-05-27 ENCOUNTER — TELEPHONE (OUTPATIENT)
Dept: INTERNAL MEDICINE CLINIC | Age: 61
End: 2021-05-27

## 2021-05-27 NOTE — TELEPHONE ENCOUNTER
Called PT to schedule PT a CT Lung Screen and VL Dup lower Extremity arteries bilateral  And PT stated all locations are too far out PT would like to go to [facility].

## 2021-06-02 NOTE — TELEPHONE ENCOUNTER
Arteries bilateral and CT lung screen scheduled for 6/18/2021 at 1:00 and 2:15     Called PT no answer

## 2021-06-06 ASSESSMENT — ENCOUNTER SYMPTOMS
EYES NEGATIVE: 1
RESPIRATORY NEGATIVE: 1

## 2021-06-22 ENCOUNTER — OFFICE VISIT (OUTPATIENT)
Dept: INTERNAL MEDICINE CLINIC | Age: 61
End: 2021-06-22
Payer: MEDICARE

## 2021-06-22 VITALS
HEIGHT: 67 IN | DIASTOLIC BLOOD PRESSURE: 88 MMHG | WEIGHT: 186.2 LBS | HEART RATE: 66 BPM | BODY MASS INDEX: 29.22 KG/M2 | SYSTOLIC BLOOD PRESSURE: 130 MMHG | OXYGEN SATURATION: 98 %

## 2021-06-22 DIAGNOSIS — F17.219 CIGARETTE NICOTINE DEPENDENCE WITH NICOTINE-INDUCED DISORDER: ICD-10-CM

## 2021-06-22 DIAGNOSIS — E11.8 TYPE 2 DIABETES MELLITUS WITH COMPLICATION, WITHOUT LONG-TERM CURRENT USE OF INSULIN (HCC): Primary | ICD-10-CM

## 2021-06-22 DIAGNOSIS — E78.2 MIXED HYPERLIPIDEMIA: ICD-10-CM

## 2021-06-22 DIAGNOSIS — I10 ESSENTIAL HYPERTENSION: ICD-10-CM

## 2021-06-22 DIAGNOSIS — I73.9 CLAUDICATION IN PERIPHERAL VASCULAR DISEASE (HCC): ICD-10-CM

## 2021-06-22 DIAGNOSIS — I73.9 PAD (PERIPHERAL ARTERY DISEASE) (HCC): ICD-10-CM

## 2021-06-22 LAB
CHP ED QC CHECK: NORMAL
GLUCOSE BLD-MCNC: 110 MG/DL

## 2021-06-22 PROCEDURE — 2022F DILAT RTA XM EVC RTNOPTHY: CPT | Performed by: INTERNAL MEDICINE

## 2021-06-22 PROCEDURE — 3044F HG A1C LEVEL LT 7.0%: CPT | Performed by: INTERNAL MEDICINE

## 2021-06-22 PROCEDURE — G8427 DOCREV CUR MEDS BY ELIG CLIN: HCPCS | Performed by: INTERNAL MEDICINE

## 2021-06-22 PROCEDURE — 99214 OFFICE O/P EST MOD 30 MIN: CPT | Performed by: INTERNAL MEDICINE

## 2021-06-22 PROCEDURE — 82962 GLUCOSE BLOOD TEST: CPT | Performed by: INTERNAL MEDICINE

## 2021-06-22 PROCEDURE — G8417 CALC BMI ABV UP PARAM F/U: HCPCS | Performed by: INTERNAL MEDICINE

## 2021-06-22 PROCEDURE — 3017F COLORECTAL CA SCREEN DOC REV: CPT | Performed by: INTERNAL MEDICINE

## 2021-06-22 PROCEDURE — 4004F PT TOBACCO SCREEN RCVD TLK: CPT | Performed by: INTERNAL MEDICINE

## 2021-06-22 RX ORDER — CILOSTAZOL 100 MG/1
100 TABLET ORAL 2 TIMES DAILY
Qty: 60 TABLET | Refills: 5 | Status: SHIPPED | OUTPATIENT
Start: 2021-06-22 | End: 2021-10-11

## 2021-06-22 ASSESSMENT — PATIENT HEALTH QUESTIONNAIRE - PHQ9
SUM OF ALL RESPONSES TO PHQ9 QUESTIONS 1 & 2: 0
SUM OF ALL RESPONSES TO PHQ QUESTIONS 1-9: 0
1. LITTLE INTEREST OR PLEASURE IN DOING THINGS: 0
SUM OF ALL RESPONSES TO PHQ QUESTIONS 1-9: 0
2. FEELING DOWN, DEPRESSED OR HOPELESS: 0
SUM OF ALL RESPONSES TO PHQ QUESTIONS 1-9: 0

## 2021-06-22 NOTE — PROGRESS NOTES
Patient: Tan Becker is a 59 y.o. male who presents today with the following Chief Complaint(s):    Chief Complaint   Patient presents with    Follow-up    Diabetes    Joint Pain    Shortness of Breath         HPI He complains of bilaterally burning calf pain with walking after about 1/2 blk. Arterial doppler demonstrated bilateral lower extremity moderate arterial insufficiency at rest c/w intermittent claudication. He has HTN, DM, T2, and HLD. He does continue to smoke cigarettes. Current Outpatient Medications   Medication Sig Dispense Refill    atorvastatin (LIPITOR) 40 MG tablet Take 1 tablet by mouth daily 90 tablet 3    methocarbamol (ROBAXIN) 500 MG tablet Take 1 tablet by mouth 2 times daily as needed (muscle spasm) 60 tablet 1    losartan (COZAAR) 50 MG tablet Take 1 tablet by mouth daily 30 tablet 5    allopurinol (ZYLOPRIM) 300 MG tablet TAKE 1 TABLET BY MOUTH DAILY FOR GOUT 90 tablet 2    amLODIPine (NORVASC) 5 MG tablet TAKE 1 TABLET BY MOUTH EVERY DAY 90 tablet 2    colchicine (MITIGARE) 0.6 MG capsule Lot [id number] Exp 5/2020 28 capsule 0    metFORMIN (GLUCOPHAGE) 500 MG tablet TAKE 1 TABLET BY MOUTH DAILY (WITH BREAKFAST) FOR DIABETES. 90 tablet 2    gabapentin (NEURONTIN) 100 MG capsule TAKE 1 CAPSULE BY MOUTH 3 TIMES DAILY AS NEEDED (NERVE PAIN.) FOR UP TO 30 DAYS. 90 capsule 1    aspirin 81 MG tablet Take 1 tablet by mouth daily 30 tablet 11     No current facility-administered medications for this visit. Patient's past medical history, surgical history, family history, medications,and allergies  were all reviewed and updated as appropriate today. Review of Systems   Constitutional: Negative. HENT: Negative. Eyes: Negative. Respiratory: Negative. Cardiovascular:        HTN, treated with ARB, Ca beauker. PAD, see HPI. Gastrointestinal: Negative. Endocrine:        Hyperlipidemia, treated with statin. Prior .       DM, T2, treated with metformin. Latest A1c 5.7. Genitourinary: Negative. Musculoskeletal: Negative. Skin: Negative. Neurological: Negative. Psychiatric/Behavioral: Negative. Physical Exam  Constitutional:       General: He is not in acute distress. Appearance: He is well-developed. HENT:      Head: Normocephalic and atraumatic. Right Ear: External ear normal.      Left Ear: External ear normal.      Nose: Nose normal.   Eyes:      General: No scleral icterus. Conjunctiva/sclera: Conjunctivae normal.      Pupils: Pupils are equal, round, and reactive to light. Neck:      Thyroid: No thyromegaly. Cardiovascular:      Rate and Rhythm: Normal rate and regular rhythm. Heart sounds: Normal heart sounds. Comments: No DP/PT pulses appreciated. Capillary refill sluggish. Feet are warm. No ulcers. Pulmonary:      Effort: Pulmonary effort is normal.      Breath sounds: Normal breath sounds. Abdominal:      General: Bowel sounds are normal.      Palpations: Abdomen is soft. There is no mass. Musculoskeletal:      Cervical back: Normal range of motion and neck supple. Lymphadenopathy:      Cervical: No cervical adenopathy. Skin:     General: Skin is warm and dry. Neurological:      Mental Status: He is alert and oriented to person, place, and time. Deep Tendon Reflexes: Reflexes are normal and symmetric. Psychiatric:         Behavior: Behavior normal.         Thought Content: Thought content normal.         Judgment: Judgment normal.         Vitals:    06/22/21 1221   BP: 130/88   Pulse: 66   SpO2: 98%       Assessment:   Diagnosis Orders   1. Type 2 diabetes mellitus with complication, without long-term current use of insulin ([facility])  Diet, physical activity and med compliance stressed. SGLT2 inhibitor discussed. POCT Glucose   2.  PAD (peripheral artery disease) ([facility])  cilostazol (PLETAL) 100 MG tablet    Lorenzo Bhakta MD, Vascular Surgery, [facility] External Referral To Vascular Surgery  Risk factor control stressed including complete smoking cessation. 3. Essential hypertension  Continue Ca blker and ARB. DASH and low sodium diet discussed. 4. Mixed hyperlipidemia  Heart healthy diet and statin compliance discussed. 5. Cigarette nicotine dependence with nicotine-induced disorder  Continue to work on complete smoking cessation. Read the literature, take medication if prescribed, contact me if there are further questions. Plan:  See plans above.

## 2021-07-18 PROBLEM — I73.9 CLAUDICATION IN PERIPHERAL VASCULAR DISEASE (HCC): Status: ACTIVE | Noted: 2021-07-18

## 2021-07-18 ASSESSMENT — ENCOUNTER SYMPTOMS
RESPIRATORY NEGATIVE: 1
GASTROINTESTINAL NEGATIVE: 1
EYES NEGATIVE: 1

## 2021-07-28 ENCOUNTER — OFFICE VISIT (OUTPATIENT)
Dept: VASCULAR SURGERY | Age: 61
End: 2021-07-28
Payer: MEDICARE

## 2021-07-28 VITALS
HEART RATE: 60 BPM | WEIGHT: 187 LBS | DIASTOLIC BLOOD PRESSURE: 80 MMHG | HEIGHT: 66 IN | SYSTOLIC BLOOD PRESSURE: 151 MMHG | BODY MASS INDEX: 30.05 KG/M2

## 2021-07-28 DIAGNOSIS — I73.9 CLAUDICATION IN PERIPHERAL VASCULAR DISEASE (HCC): Primary | ICD-10-CM

## 2021-07-28 PROCEDURE — G8417 CALC BMI ABV UP PARAM F/U: HCPCS | Performed by: SURGERY

## 2021-07-28 PROCEDURE — G8427 DOCREV CUR MEDS BY ELIG CLIN: HCPCS | Performed by: SURGERY

## 2021-07-28 PROCEDURE — 4004F PT TOBACCO SCREEN RCVD TLK: CPT | Performed by: SURGERY

## 2021-07-28 PROCEDURE — 99205 OFFICE O/P NEW HI 60 MIN: CPT | Performed by: SURGERY

## 2021-07-28 PROCEDURE — 3017F COLORECTAL CA SCREEN DOC REV: CPT | Performed by: SURGERY

## 2021-07-28 NOTE — Clinical Note
Dr. Bayron Husain,    I saw Mr. Pippa Barraza in the office today for evaluation of his progressively worsening bilateral lower extremity claudication. Clearly his symptoms are vascular in origin but he has no evidence of limb threatening ischemia. I offered him further management with angiography and intervention if appropriate however at this time he feels as though he would like to continue with conservative management including the recently prescribed Pletal.  He will return to see me in the next 2 to 3 months if he has no improvement in his symptoms. If you have any questions please feel free to contact me. Thanks for asked me to see him and please let me know if I can help you with any other patients in the future.     Skyla Miguel
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
## 2021-08-25 ENCOUNTER — OFFICE VISIT (OUTPATIENT)
Dept: INTERNAL MEDICINE CLINIC | Age: 61
End: 2021-08-25
Payer: MEDICARE

## 2021-08-25 VITALS
HEIGHT: 67 IN | DIASTOLIC BLOOD PRESSURE: 84 MMHG | BODY MASS INDEX: 29.13 KG/M2 | HEART RATE: 65 BPM | WEIGHT: 185.6 LBS | SYSTOLIC BLOOD PRESSURE: 136 MMHG | OXYGEN SATURATION: 97 %

## 2021-08-25 DIAGNOSIS — I73.9 PAD (PERIPHERAL ARTERY DISEASE) (HCC): ICD-10-CM

## 2021-08-25 DIAGNOSIS — E78.2 MIXED HYPERLIPIDEMIA: ICD-10-CM

## 2021-08-25 DIAGNOSIS — I10 ESSENTIAL HYPERTENSION: ICD-10-CM

## 2021-08-25 DIAGNOSIS — Z12.5 PROSTATE CANCER SCREENING: ICD-10-CM

## 2021-08-25 DIAGNOSIS — Z00.00 ROUTINE GENERAL MEDICAL EXAMINATION AT A HEALTH CARE FACILITY: ICD-10-CM

## 2021-08-25 DIAGNOSIS — M62.838 MUSCLE SPASM: ICD-10-CM

## 2021-08-25 DIAGNOSIS — E11.8 TYPE 2 DIABETES MELLITUS WITH COMPLICATION, WITHOUT LONG-TERM CURRENT USE OF INSULIN (HCC): Primary | ICD-10-CM

## 2021-08-25 LAB
CHP ED QC CHECK: NORMAL
GLUCOSE BLD-MCNC: 89 MG/DL
HBA1C MFR BLD: 5.7 %

## 2021-08-25 PROCEDURE — G0438 PPPS, INITIAL VISIT: HCPCS | Performed by: INTERNAL MEDICINE

## 2021-08-25 PROCEDURE — 83036 HEMOGLOBIN GLYCOSYLATED A1C: CPT | Performed by: INTERNAL MEDICINE

## 2021-08-25 PROCEDURE — 3017F COLORECTAL CA SCREEN DOC REV: CPT | Performed by: INTERNAL MEDICINE

## 2021-08-25 PROCEDURE — 82962 GLUCOSE BLOOD TEST: CPT | Performed by: INTERNAL MEDICINE

## 2021-08-25 PROCEDURE — 3044F HG A1C LEVEL LT 7.0%: CPT | Performed by: INTERNAL MEDICINE

## 2021-08-25 RX ORDER — METHOCARBAMOL 500 MG/1
500 TABLET, FILM COATED ORAL 2 TIMES DAILY PRN
Qty: 60 TABLET | Refills: 1 | Status: SHIPPED | OUTPATIENT
Start: 2021-08-25 | End: 2021-12-13

## 2021-08-25 RX ORDER — AMLODIPINE BESYLATE 5 MG/1
TABLET ORAL
Qty: 90 TABLET | Refills: 3 | Status: SHIPPED | OUTPATIENT
Start: 2021-08-25 | End: 2021-12-14 | Stop reason: SDUPTHER

## 2021-08-25 SDOH — ECONOMIC STABILITY: FOOD INSECURITY: WITHIN THE PAST 12 MONTHS, YOU WORRIED THAT YOUR FOOD WOULD RUN OUT BEFORE YOU GOT MONEY TO BUY MORE.: NEVER TRUE

## 2021-08-25 SDOH — ECONOMIC STABILITY: FOOD INSECURITY: WITHIN THE PAST 12 MONTHS, THE FOOD YOU BOUGHT JUST DIDN'T LAST AND YOU DIDN'T HAVE MONEY TO GET MORE.: NEVER TRUE

## 2021-08-25 ASSESSMENT — SOCIAL DETERMINANTS OF HEALTH (SDOH): HOW HARD IS IT FOR YOU TO PAY FOR THE VERY BASICS LIKE FOOD, HOUSING, MEDICAL CARE, AND HEATING?: NOT HARD AT ALL

## 2021-08-25 ASSESSMENT — PATIENT HEALTH QUESTIONNAIRE - PHQ9
2. FEELING DOWN, DEPRESSED OR HOPELESS: 0
SUM OF ALL RESPONSES TO PHQ QUESTIONS 1-9: 0
1. LITTLE INTEREST OR PLEASURE IN DOING THINGS: 0
SUM OF ALL RESPONSES TO PHQ QUESTIONS 1-9: 0
SUM OF ALL RESPONSES TO PHQ QUESTIONS 1-9: 0
SUM OF ALL RESPONSES TO PHQ9 QUESTIONS 1 & 2: 0

## 2021-08-25 ASSESSMENT — LIFESTYLE VARIABLES: HOW OFTEN DO YOU HAVE A DRINK CONTAINING ALCOHOL: 0

## 2021-08-25 NOTE — PATIENT INSTRUCTIONS
Advance Directives: Care Instructions  Overview  An advance directive is a legal way to state your wishes at the end of your life. It tells your family and your doctor what to do if you can't say what you want. There are two main types of advance directives. You can change them any time your wishes change. Living will. This form tells your family and your doctor your wishes about life support and other treatment. The form is also called a declaration. Medical power of . This form lets you name a person to make treatment decisions for you when you can't speak for yourself. This person is called a health care agent (health care proxy, health care surrogate). The form is also called a durable power of  for health care. If you do not have an advance directive, decisions about your medical care may be made by a family member, or by a doctor or a  who doesn't know you. It may help to think of an advance directive as a gift to the people who care for you. If you have one, they won't have to make tough decisions by themselves. Follow-up care is a key part of your treatment and safety. Be sure to make and go to all appointments, and call your doctor if you are having problems. It's also a good idea to know your test results and keep a list of the medicines you take. What should you include in an advance directive? Many states have a unique advance directive form. (It may ask you to address specific issues.) Or you might use a universal form that's approved by many states. If your form doesn't tell you what to address, it may be hard to know what to include in your advance directive. Use the questions below to help you get started. · Who do you want to make decisions about your medical care if you are not able to? · What life-support measures do you want if you have a serious illness that gets worse over time or can't be cured? · What are you most afraid of that might happen? (Maybe you're afraid of having pain, losing your independence, or being kept alive by machines.)  · Where would you prefer to die? (Your home? A hospital? A nursing home?)  · Do you want to donate your organs when you die? · Do you want certain Amish practices performed before you die? When should you call for help? Be sure to contact your doctor if you have any questions. Where can you learn more? Go to [email or web address]://[email or web address].[email or web address] org and sign in to your HowGood account. Enter R264 in the RECESS. box to learn more about \"Advance Directives: Care Instructions. \"     If you do not have an account, please click on the \"Sign Up Now\" link. Current as of: March 17, 2021               Content Version: 12.9  © 2006-2021 Healthwise, Tiempy. Care instructions adapted under license by [facility] ([facility]). If you have questions about a medical condition or this instruction, always ask your healthcare professional. [place] [address] any warranty or liability for your use of this information. Learning About Medical Power of   What is a medical power of ? A medical power of , also called a durable power of  for health care, is one type of the legal forms called advance directives. It lets you name the person you want to make treatment decisions for you if you can't speak or decide for yourself. The person you choose is called your health care agent. This person is also called a health care proxy or health care surrogate. A medical power of  may be called something else in your state. How do you choose a health care agent? Choose your health care agent carefully. This person may or may not be a family member. Talk to the person before you make your final decision. Make sure he or she is comfortable with this responsibility. It's a good idea to choose someone who:  · Is at least 25years old.   · Knows you well and understands what makes life meaningful for you. · Understands your Latter day and moral values. · Will do what you want, not what he or she wants. · Will be able to make difficult choices at a stressful time. · Will be able to refuse or stop treatment, if that is what you would want, even if you could die. · Will be firm and confident with health professionals if needed. · Will ask questions to get needed information. · Lives near you or agrees to travel to you if needed. Your family may help you make medical decisions while you can still be part of that process. But it's important to choose one person to be your health care agent in case you aren't able to make decisions for yourself. If you don't fill out the legal form and name a health care agent, the decisions your family can make may be limited. A health care agent may be called something else in your state. Who will make decisions for you if you don't have a health care agent? If you don't have a health care agent or a living will, you may not get the care you want. Decisions may be made by family members who disagree about your medical care. Or decisions may be made by a medical professional who doesn't know you well. In some cases, a  makes the decisions. When you name a health care agent, it is very clear who has the power to make health decisions for you. How do you name a health care agent? You name your health care agent on a legal form. This form is usually called a medical power of . Ask your hospital, state bar association, or office on aging where to find these forms. You must sign the form to make it legal. Some states require you to get the form notarized. This means that a person called a  watches you sign the form and then he or she signs the form. Some states also require that two or more witnesses sign the form. Be sure to tell your family members and doctors who your health care agent is.   Where can you learn more? Go to [email or web address] org and sign in to your ONOFFMIX (?????) account. Enter [id number] in the [facility] box to learn more about \"Learning About [address]. \"     If you do not have an account, please click on the \"Sign Up Now\" link. Current as of: March 17, 2021               Content Version: 12.9  © 2006-2021 Healthwise, Involvio. Care instructions adapted under license by [facility] ([facility]). If you have questions about a medical condition or this instruction, always ask your healthcare professional. [address] any warranty or liability for your use of this information. Learning About Living Perroy  What is a living will? A living will, also called a declaration, is a legal form. It tells your family and your doctor your wishes when you can't speak for yourself. It's used by the health professionals who will treat you as you near the end of your life or if you get seriously hurt or ill. If you put your wishes in writing, your loved ones and others will know what kind of care you want. They won't need to guess. This can ease your mind and be helpful to others. And you can change or cancel your living will at any time. A living will is not the same as an estate or property will. An estate will explains what you want to happen with your money and property after you die. How do you use it? A living will is used to describe the kinds of treatment or life support you want as you near the end of your life or if you get seriously hurt or ill. Keep these facts in mind about living alonso. · Your living will is used only if you can't speak or make decisions for yourself. Most often, one or more doctors must certify that you can't speak or decide for yourself before your living will takes effect. · If you get better and can speak for yourself again, you can accept or refuse any treatment.  It doesn't matter what you said in your living will. · Some states may limit your right to refuse treatment in certain cases. For example, you may need to clearly state in your living will that you don't want artificial hydration and nutrition, such as being fed through a tube. Is a living will a legal document? A living will is a legal document. Each state has its own laws about living alonso. And a living will may be called something else in your state. Here are some things to know about living alonso. · You don't need an  to complete a living will. But legal advice can be helpful if your state's laws are unclear. It can also help if your health history is complicated or your family can't agree on what should be in your living will. · You can change your living will at any time. Some people find that their wishes about end-of-life care change as their health changes. If you make big changes to your living will, complete a new form. · If you move to another state, make sure that your living will is legal in the state where you now live. In most cases, doctors will respect your wishes even if you have a form from a different state. · You might use a universal form that has been approved by many states. This kind of form can sometimes be filled out and stored online. Your digital copy will then be available wherever you have a connection to the internet. The doctors and nurses who need to treat you can find it right away. · Your state may offer an online registry. This is another place where you can store your living will online. · It's a good idea to get your living will notarized. This means using a person called a  to watch two people sign, or witness, your living will. What should you know when you create a living will? Here are some questions to ask yourself as you make your living will:  · Do you know enough about life support methods that might be used?  If not, talk to your doctor so you know what might be done if you can't breathe on your own, your heart stops, or you can't swallow. · What things would you still want to be able to do after you receive life-support methods? Would you want to be able to walk? To speak? To eat on your own? To live without the help of machines? · Do you want certain Episcopal practices performed if you become very ill? · If you have a choice, where do you want to be cared for? In your home? At a hospital or nursing home? · If you have a choice at the end of your life, where would you prefer to die? At home? In a hospital or nursing home? Somewhere else? · Would you prefer to be buried or cremated? · Do you want your organs to be donated after you die? What should you do with your living will? · Make sure that your family members and your health care agent have copies of your living will (also called a declaration). · Give your doctor a copy of your living will. Ask him or her to keep it as part of your medical record. If you have more than one doctor, make sure that each one has a copy. · Put a copy of your living will where it can be easily found. For example, some people may put a copy on their refrigerator door. If you are using a digital copy, be sure your doctor, family members, and health care agent know how to find and access it. Where can you learn more? Go to [email or web address] and sign in to your [employer] account. Enter U092 in the [facility] box to learn more about \"Learning About Living [place]. \"     If you do not have an account, please click on the \"Sign Up Now\" link. Current as of: March 17, 2021               Content Version: 12.9  © 2780-2979 Healthwise, Incorporated. Care instructions adapted under license by [facility] ([facility]). If you have questions about a medical condition or this instruction, always ask your healthcare professional. [address] any warranty or liability for your use of this information. Stopping Smoking: Care Instructions  Your Care Instructions     Cigarette smokers crave the nicotine in cigarettes. Giving it up is much harder than simply changing a habit. Your body has to stop craving the nicotine. It is hard to quit, but you can do it. There are many tools that people use to quit smoking. You may find that combining tools works best for you. There are several steps to quitting. First you get ready to quit. Then you get support to help you. After that, you learn new skills and behaviors to become a nonsmoker. For many people, a necessary step is getting and using medicine. Your doctor will help you set up the plan that best meets your needs. You may want to attend a smoking cessation program to help you quit smoking. When you choose a program, look for one that has proven success. Ask your doctor for ideas. You will greatly increase your chances of success if you take medicine as well as get counseling or join a cessation program.  Some of the changes you feel when you first quit tobacco are uncomfortable. Your body will miss the nicotine at first, and you may feel short-tempered and grumpy. You may have trouble sleeping or concentrating. Medicine can help you deal with these symptoms. You may struggle with changing your smoking habits and rituals. The last step is the tricky one: Be prepared for the smoking urge to continue for a time. This is a lot to deal with, but keep at it. You will feel better. Follow-up care is a key part of your treatment and safety. Be sure to make and go to all appointments, and call your doctor if you are having problems. It's also a good idea to know your test results and keep a list of the medicines you take. How can you care for yourself at home? · Ask your family, friends, and coworkers for support. You have a better chance of quitting if you have help and support.   · Join a support group, such as Nicotine Anonymous, for people who are trying to quit smoking. · Consider signing up for a smoking cessation program, such as the American Lung Association's Freedom from Smoking program.  · Get text messaging support. Go to the website at www.smokefree. gov to sign up for the [facility] program.  · Set a quit date. Pick your date carefully so that it is not right in the middle of a big deadline or stressful time. Once you quit, do not even take a puff. Get rid of all ashtrays and lighters after your last cigarette. Clean your house and your clothes so that they do not smell of smoke. · Learn how to be a nonsmoker. Think about ways you can avoid those things that make you reach for a cigarette. ? Avoid situations that put you at greatest risk for smoking. For some people, it is hard to have a drink with friends without smoking. For others, they might skip a coffee break with coworkers who smoke. ? Change your daily routine. Take a different route to work or eat a meal in a different place. · Cut down on stress. Calm yourself or release tension by doing an activity you enjoy, such as reading a book, taking a hot bath, or gardening. · Talk to your doctor or pharmacist about nicotine replacement therapy, which replaces the nicotine in your body. You still get nicotine but you do not use tobacco. Nicotine replacement products help you slowly reduce the amount of nicotine you need. These products come in several forms, many of them available over-the-counter:  ? Nicotine patches  ? Nicotine gum and lozenges  ? Nicotine inhaler  · Ask your doctor about bupropion (Wellbutrin) or varenicline (Chantix), which are prescription medicines. They do not contain nicotine. They help you by reducing withdrawal symptoms, such as stress and anxiety. · Some people find hypnosis, acupuncture, and massage helpful for ending the smoking habit. · Eat a healthy diet and get regular exercise. Having healthy habits will help your body move past its craving for nicotine.   · Be prepared to keep trying. Most people are not successful the first few times they try to quit. Do not get mad at yourself if you smoke again. Make a list of things you learned and think about when you want to try again, such as next week, next month, or next year. Where can you learn more? Go to https://chpepicewtish.[email or web address] org and sign in to your [employer] account. Enter D402 in the ProteoGenix box to learn more about \"Stopping Smoking: Care Instructions. \"     If you do not have an account, please click on the \"Sign Up Now\" link. Current as of: February 11, 2021               Content Version: 12.9  © 2006-2021 Traxer. Care instructions adapted under license by [facility] ([facility]). If you have questions about a medical condition or this instruction, always ask your healthcare professional. [place] [address] any warranty or liability for your use of this information. Learning About Benefits From Quitting Smoking  How does quitting smoking make you healthier? If you're thinking about quitting smoking, you may have a few reasons to be smoke-free. Your health may be one of them. · When you quit smoking, you lower your risks for cancer, lung disease, heart attack, stroke, blood vessel disease, and blindness from macular degeneration. · When you're smoke-free, you get sick less often, and you heal faster. You are less likely to get colds, flu, bronchitis, and pneumonia. · As a nonsmoker, you may find that your mood is better and you are less stressed. When and how will you feel healthier? Quitting has real health benefits that start from day 1 of being smoke-free. And the longer you stay smoke-free, the healthier you get and the better you feel. The first hours  · After just 20 minutes, your blood pressure and heart rate go down. That means there's less stress on your heart and blood vessels.   · Within 12 hours, the level of carbon monoxide in your blood drops back to normal. That makes room for more oxygen. With more oxygen in your body, you may notice that you have more energy than when you smoked. After 2 weeks  · Your lungs start to work better. · Your risk of heart attack starts to drop. After 1 month  · When your lungs are clear, you cough less and breathe deeper, so it's easier to be active. · Your sense of taste and smell return. That means you can enjoy food more than you have since you started smoking. Over the years  · Over the years, your risks of heart disease, heart attack, and stroke are lower. · After 10 years, your risk of dying from lung cancer is cut by about half. And your risk for many other types of cancer is lower too. How would quitting help others in your life? When you quit smoking, you improve the health of everyone who now breathes in your smoke. · Their heart, lung, and cancer risks drop, much like yours. · They are sick less. For babies and small children, living smoke-free means they're less likely to have ear infections, pneumonia, and bronchitis. · If you're a woman who is or will be pregnant someday, quitting smoking means a healthier . · Children who are close to you are less likely to become adult smokers. Where can you learn more? Go to https://aWhere.Neuro Kinetics. org and sign in to your Numerate account. Enter [phone] in the [facility] box to learn more about \"Learning About Benefits From Quitting Smoking. \"     If you do not have an account, please click on the \"Sign Up Now\" link. Current as of: 2021               Content Version: 12.9  © 2207-5695 Healthwise, Incorporated. Care instructions adapted under license by [facility] ([facility]). If you have questions about a medical condition or this instruction, always ask your healthcare professional. [place] [address] any warranty or liability for your use of this information.            Deciding About Using Medicines To Quit effects. Why might you choose not to use medicine? · You want to try quitting on your own by stopping all at once (\"cold turkey\"). · You want to cut back slowly on the number of cigarettes you smoke. · You smoke fewer than 5 cigarettes a day. · You do not like using medicine. · You feel the side effects of medicines outweigh the benefits. · You are worried about the cost of medicines. Your decision  Thinking about the facts and your feelings can help you make a decision that is right for you. Be sure you understand the benefits and risks of your options, and think about what else you need to do before you make the decision. Where can you learn more? Go to https://NimsoftpeITYZ.Azelon [email or web address] org and sign in to your Netcordia account. Enter T990 in the [employer] box to learn more about \"Deciding About Using Medicines To Quit Smoking. \"     If you do not have an account, please click on the \"Sign Up Now\" link. Current as of: February 11, 2021               Content Version: 12.9  © 8490-7852 Healthwise, [employer] Micro[employer]. Care instructions adapted under license by [facility] ([facility]). If you have questions about a medical condition or this instruction, always ask your healthcare professional. [place] [address] any warranty or liability for your use of this information. Personalized Preventive Plan for [place] - 8/25/2021  Medicare offers a range of preventive health benefits. Some of the tests and screenings are paid in full while other may be subject to a deductible, co-insurance, and/or copay. Some of these benefits include a comprehensive review of your medical history including lifestyle, illnesses that may run in your family, and various assessments and screenings as appropriate. After reviewing your medical record and screening and assessments performed today your provider may have ordered immunizations, labs, imaging, and/or referrals for you.   A list of these orders (if applicable) as well as your Preventive Care list are included within your After Visit Summary for your review. Other Preventive Recommendations:    · A preventive eye exam performed by an eye specialist is recommended every 1-2 years to screen for glaucoma; cataracts, macular degeneration, and other eye disorders. · A preventive dental visit is recommended every 6 months. · Try to get at least 150 minutes of exercise per week or 10,000 steps per day on a pedometer . · Order or download the FREE \"Exercise & Physical Activity: Your Everyday Guide\" from [employer]. Call 2-762.873.1142 or search [employer] online. · You need 4079-7656 mg of calcium and 5904-2866 IU of vitamin D per day. It is possible to meet your calcium requirement with diet alone, but a vitamin D supplement is usually necessary to meet this goal.  · When exposed to the sun, use a sunscreen that protects against both UVA and UVB radiation with an SPF of 30 or greater. Reapply every 2 to 3 hours or after sweating, drying off with a towel, or swimming. · Always wear a seat belt when traveling in a car. Always wear a helmet when riding a bicycle or motorcycle. Stopping Smoking: Care Instructions  Your Care Instructions     Cigarette smokers crave the nicotine in cigarettes. Giving it up is much harder than simply changing a habit. Your body has to stop craving the nicotine. It is hard to quit, but you can do it. There are many tools that people use to quit smoking. You may find that combining tools works best for you. There are several steps to quitting. First you get ready to quit. Then you get support to help you. After that, you learn new skills and behaviors to become a nonsmoker. For many people, a necessary step is getting and using medicine. Your doctor will help you set up the plan that best meets your needs.  You may want to attend a smoking cessation program to help you quit smoking. When you choose a program, look for one that has proven success. Ask your doctor for ideas. You will greatly increase your chances of success if you take medicine as well as get counseling or join a cessation program.  Some of the changes you feel when you first quit tobacco are uncomfortable. Your body will miss the nicotine at first, and you may feel short-tempered and grumpy. You may have trouble sleeping or concentrating. Medicine can help you deal with these symptoms. You may struggle with changing your smoking habits and rituals. The last step is the tricky one: Be prepared for the smoking urge to continue for a time. This is a lot to deal with, but keep at it. You will feel better. Follow-up care is a key part of your treatment and safety. Be sure to make and go to all appointments, and call your doctor if you are having problems. It's also a good idea to know your test results and keep a list of the medicines you take. How can you care for yourself at home? · Ask your family, friends, and coworkers for support. You have a better chance of quitting if you have help and support. · Join a support group, such as Nicotine Anonymous, for people who are trying to quit smoking. · Consider signing up for a smoking cessation program, such as the American Lung Association's Freedom from Smoking program.  · Get text messaging support. Go to the website at www.smokefree. gov to sign up for the [facility] program.  · Set a quit date. Pick your date carefully so that it is not right in the middle of a big deadline or stressful time. Once you quit, do not even take a puff. Get rid of all ashtrays and lighters after your last cigarette. Clean your house and your clothes so that they do not smell of smoke. · Learn how to be a nonsmoker. Think about ways you can avoid those things that make you reach for a cigarette. ? Avoid situations that put you at greatest risk for smoking.  For some people, it is hard to have a drink with friends without smoking. For others, they might skip a coffee break with coworkers who smoke. ? Change your daily routine. Take a different route to work or eat a meal in a different place. · Cut down on stress. Calm yourself or release tension by doing an activity you enjoy, such as reading a book, taking a hot bath, or gardening. · Talk to your doctor or pharmacist about nicotine replacement therapy, which replaces the nicotine in your body. You still get nicotine but you do not use tobacco. Nicotine replacement products help you slowly reduce the amount of nicotine you need. These products come in several forms, many of them available over-the-counter:  ? Nicotine patches  ? Nicotine gum and lozenges  ? Nicotine inhaler  · Ask your doctor about bupropion (Wellbutrin) or varenicline (Chantix), which are prescription medicines. They do not contain nicotine. They help you by reducing withdrawal symptoms, such as stress and anxiety. · Some people find hypnosis, acupuncture, and massage helpful for ending the smoking habit. · Eat a healthy diet and get regular exercise. Having healthy habits will help your body move past its craving for nicotine. · Be prepared to keep trying. Most people are not successful the first few times they try to quit. Do not get mad at yourself if you smoke again. Make a list of things you learned and think about when you want to try again, such as next week, next month, or next year. Where can you learn more? Go to https://Nebel.TVpeProfex.Infracommerce. org and sign in to your Mobango account. Enter O212 in the Seek & Adore box to learn more about \"Stopping Smoking: Care Instructions. \"     If you do not have an account, please click on the \"Sign Up Now\" link. Current as of: February 11, 2021               Content Version: 12.9  © 4701-7022 Healthwise, Incorporated. Care instructions adapted under license by [facility] ([facility]).  If you have questions about a medical condition or this instruction, always ask your healthcare professional. [address] any warranty or liability for your use of this information. ·        Learning About Benefits From Quitting Smoking  How does quitting smoking make you healthier? If you're thinking about quitting smoking, you may have a few reasons to be smoke-free. Your health may be one of them. · When you quit smoking, you lower your risks for cancer, lung disease, heart attack, stroke, blood vessel disease, and blindness from macular degeneration. · When you're smoke-free, you get sick less often, and you heal faster. You are less likely to get colds, flu, bronchitis, and pneumonia. · As a nonsmoker, you may find that your mood is better and you are less stressed. When and how will you feel healthier? Quitting has real health benefits that start from day 1 of being smoke-free. And the longer you stay smoke-free, the healthier you get and the better you feel. The first hours  · After just 20 minutes, your blood pressure and heart rate go down. That means there's less stress on your heart and blood vessels. · Within 12 hours, the level of carbon monoxide in your blood drops back to normal. That makes room for more oxygen. With more oxygen in your body, you may notice that you have more energy than when you smoked. After 2 weeks  · Your lungs start to work better. · Your risk of heart attack starts to drop. After 1 month  · When your lungs are clear, you cough less and breathe deeper, so it's easier to be active. · Your sense of taste and smell return. That means you can enjoy food more than you have since you started smoking. Over the years  · Over the years, your risks of heart disease, heart attack, and stroke are lower. · After 10 years, your risk of dying from lung cancer is cut by about half. And your risk for many other types of cancer is lower too.   How would quitting help others in your life?  When you quit smoking, you improve the health of everyone who now breathes in your smoke. · Their heart, lung, and cancer risks drop, much like yours. · They are sick less. For babies and small children, living smoke-free means they're less likely to have ear infections, pneumonia, and bronchitis. · If you're a woman who is or will be pregnant someday, quitting smoking means a healthier . · Children who are close to you are less likely to become adult smokers. Where can you learn more? Go to https://Acylin TherapeuticspeTrident Energy.Hoblee. org and sign in to your DriveK account. Enter [id number] in the [employer] box to learn more about \"Learning About Benefits From Quitting Smoking. \"     If you do not have an account, please click on the \"Sign Up Now\" link. Current as of: 2021               Content Version: 12.9  © 8981-2894 [employer]. Care instructions adapted under license by [facility] ([facility]). If you have questions about a medical condition or this instruction, always ask your healthcare professional. [place] [address] any warranty or liability for your use of this information. ·   Personalized Preventive Plan for Nikolas Limb - 2021  Medicare offers a range of preventive health benefits. Some of the tests and screenings are paid in full while other may be subject to a deductible, co-insurance, and/or copay. Some of these benefits include a comprehensive review of your medical history including lifestyle, illnesses that may run in your family, and various assessments and screenings as appropriate. After reviewing your medical record and screening and assessments performed today your provider may have ordered immunizations, labs, imaging, and/or referrals for you. A list of these orders (if applicable) as well as your Preventive Care list are included within your After Visit Summary for your review.     Other Preventive Recommendations:    A preventive eye exam performed by an eye specialist is recommended every 1-2 years to screen for glaucoma; cataracts, macular degeneration, and other eye disorders. A preventive dental visit is recommended every 6 months. Try to get at least 150 minutes of exercise per week or 10,000 steps per day on a pedometer . Order or download the FREE \"Exercise & Physical Activity: Your Everyday Guide\" from [employer]. Call 4-555.104.5417 or search [employer] online. You need 9252-3979 mg of calcium and 4808-3967 IU of vitamin D per day. It is possible to meet your calcium requirement with diet alone, but a vitamin D supplement is usually necessary to meet this goal.  When exposed to the sun, use a sunscreen that protects against both UVA and UVB radiation with an SPF of 30 or greater. Reapply every 2 to 3 hours or after sweating, drying off with a towel, or swimming. Always wear a seat belt when traveling in a car. Always wear a helmet when riding a bicycle or motorcycle.

## 2021-08-25 NOTE — PROGRESS NOTES
Medicare Annual Wellness Visit  Name: Macrina Tran Date: 2021   MRN: <[id number]> Sex: Male   Age: 59 y.o. Ethnicity: Non- / Non    : 1960 Race: Roaretha Ivory / African American      Migue Anthony is here for Diabetes, Hypertension, and Medicare AWV    Screenings for behavioral, psychosocial and functional/safety risks, and cognitive dysfunction are all negative except as indicated below. These results, as well as other patient data from the [address] form, are documented in Flowsheets linked to this Encounter. Had colonoscopy at [address] within 2 years. Has not had covid vaccine. PAD. Experiences claudication. Treated with pletal. Feels no different thus far. Occasional muscle spasms. Back. Robaxin helps. No Known Allergies    Prior to Visit Medications    Medication Sig Taking? Authorizing Provider   cilostazol (PLETAL) 100 MG tablet Take 1 tablet by mouth 2 times daily Yes Anusha Fitzgerald MD   atorvastatin (LIPITOR) 40 MG tablet Take 1 tablet by mouth daily Yes Anusha Fitzgerald MD   methocarbamol (ROBAXIN) 500 MG tablet Take 1 tablet by mouth 2 times daily as needed (muscle spasm) Yes Anusha Fitzgerald MD   losartan (COZAAR) 50 MG tablet Take 1 tablet by mouth daily Yes Anusha Fitzgerald MD   allopurinol (ZYLOPRIM) 300 MG tablet TAKE 1 TABLET BY MOUTH DAILY FOR GOUT Yes Anusha Fitzgerald MD   amLODIPine (NORVASC) 5 MG tablet TAKE 1 TABLET BY MOUTH EVERY DAY Yes Anusha Fitzgerald MD   colchicine (MITIGARE) 0.6 MG capsule Lot [id number] Exp 2020 Yes Anusha Fitzgerald MD   metFORMIN (GLUCOPHAGE) 500 MG tablet TAKE 1 TABLET BY MOUTH DAILY (WITH BREAKFAST) FOR DIABETES. Anusha Fitzgerald MD   gabapentin (NEURONTIN) 100 MG capsule TAKE 1 CAPSULE BY MOUTH 3 TIMES DAILY AS NEEDED (NERVE PAIN.) FOR UP TO 30 DAYS.   Anusha Fitzgerald MD   aspirin 81 MG tablet Take 1 tablet by mouth daily  Anusha Fitzgerald MD       Past Medical History:   Diagnosis Date    Hypertension     Type 2 diabetes mellitus with complication, without long-term current use of insulin ([facility] [address] 12/24/2018       Past Surgical History:   Procedure Laterality Date    SMALL INTESTINE SURGERY         No family history on file. CareTeam (Including outside providers/suppliers regularly involved in providing care):   Patient Care Team:  Domitila Falcon MD as PCP - General (Internal Medicine)  Domitila Falcon MD as PCP - [facility]    Wt Readings from Last 3 Encounters:   08/25/21 185 lb 9.6 oz (84.2 kg)   07/28/21 187 lb (84.8 kg)   06/22/21 186 lb 3.2 oz (84.5 kg)     Vitals:    08/25/21 0900 08/25/21 0918   BP: (!) 160/60 (!) 140/60   Site: Left Upper Arm    Position: Sitting    Cuff Size: Large Adult    Pulse: 65    SpO2: 97%    Weight: 185 lb 9.6 oz (84.2 kg)    Height: 5' 7\" (1.702 m)      Body mass index is 29.07 kg/m². Based upon direct observation of the patient, evaluation of cognition reveals intact memory and cognition. Patient's complete Health Risk Assessment and screening values have been reviewed and are found in Flowsheets. The following problems were reviewed today and where indicated follow up appointments were made and/or referrals ordered. Positive Risk Factor Screenings with Interventions:         Substance History:  Social History     Tobacco History     Smoking Status  Current Every Day Smoker Smoking Frequency  0.4 packs/day for 15 years (6 pk yrs) Smoking Tobacco Type  Cigarettes    Smokeless Tobacco Use  Never Used          Alcohol History     Alcohol Use Status  No          Drug Use     Drug Use Status  No          Sexual Activity     Sexually Active  Not Asked               Alcohol Screening:       A score of 8 or more is associated with harmful or hazardous drinking. A score of 13 or more in women, and 15 or more in men, is likely to indicate alcohol dependence. Substance Abuse Interventions:  · Counseled. · Questions answered. · Literature provided.      General Health and ACP:  General  In general, how would you say your health is?: Fair  In the past 7 days, have you experienced any of the following? New or Increased Pain, New or Increased Fatigue, Loneliness, Social Isolation, Stress or Anger?: None of These  Do you get the social and emotional support that you need?: Yes  Do you have a Living Will?: (!) No  Advance Directives     Power of [facility] Will ACP-Advance Directive ACP-Power of     Not on File Not on File Not on File Not on File      General Health Risk Interventions:  · Literature provided. · Discussion. · Questions answered. Health Habits/Nutrition:  Health Habits/Nutrition  Do you exercise for at least 20 minutes 2-3 times per week?: Yes  Have you lost any weight without trying in the past 3 months?: No  Do you eat only one meal per day?: No  Have you seen the dentist within the past year?: (!) No  Body mass index: (!) 29.07  Health Habits/Nutrition Interventions:  · Health and wellness advice given. · Literature provided. · Questions answered. Hearing/Vision:  No exam data present  Hearing/Vision  Do you or your family notice any trouble with your hearing that hasn't been managed with hearing aids?: No  Do you have difficulty driving, watching TV, or doing any of your daily activities because of your eyesight?: No  Have you had an eye exam within the past year?: (!) No  Hearing/Vision Interventions:  · Referral given. Safety:  Safety  Do you have working smoke detectors?: Yes  Have all throw rugs been removed or fastened?: Yes  Do you have non-slip mats or surfaces in all bathtubs/showers?: (!) No  Do all of your stairways have a railing or banister?: Yes  Are your doorways, halls and stairs free of clutter?: Yes  Do you always fasten your seatbelt when you are in a car?: Yes  Safety Interventions:  · Safety literature provided. · Questions answered.       Personalized Preventive Plan   Current Health Maintenance Status  Immunization History   Administered Date(s) Administered    Influenza, Quadv, IM, PF (6 mo and older Fluzone, Flulaval, Fluarix, and 3 yrs and older Afluria) 11/18/2020    Pneumococcal Polysaccharide ([id number]) 01/10/2017        Health Maintenance   Topic Date Due    Diabetic retinal exam  Never done    COVID-19 Vaccine (1) Never done    DTaP/Tdap/Td vaccine (1 - Tdap) Never done    Colon cancer screen colonoscopy  Never done    Shingles Vaccine (1 of 2) Never done   [employer] Visit (AWV)  Never done    Diabetic foot exam  01/31/2020    Diabetic microalbuminuria test  01/30/2021    Lipid screen  01/30/2021    Potassium monitoring  01/30/2021    Creatinine monitoring  01/30/2021    Flu vaccine (1) 09/01/2021    A1C test (Diabetic or Prediabetic)  05/18/2022    Pneumococcal 0-64 years Vaccine (2 of 2 - PPSV23) 06/12/2023    Hepatitis C screen  Completed    HIV screen  Completed    Hepatitis A vaccine  Aged Out    Hib vaccine  Aged Out    Meningococcal (ACWY) vaccine  Aged Out     Recommendations for [employer] Due: see orders and patient instructions/AVS.  . Recommended screening schedule for the next 5-10 years is provided to the patient in written form: see Patient Instructions/AVS.            Advance Care Planning   Advanced Care Planning: Discussed the patients choices for care and treatment in case of a health event that adversely affects decision-making abilities. Also discussed the patients long-term treatment options. Reviewed with the patient the [address] [place] [address] of [address] [place] [address] Declaration forms  Reviewed the process of designating a competent adult as an Agent (or -in-fact) that could take make health care decisions for the patient if incompetent. Patient was asked to complete the declaration forms, either acknowledge the forms by a public notary or an eligible witness and provide a signed copy to the practice office.   Time spent (minutes): 7 minutes. Cardiovascular Disease Risk Counseling: Assessed the patient's risk to develop cardiovascular disease and reviewed main risk factors. Reviewed steps to reduce disease risk including:   · Quitting tobacco use, reducing amount smoked, or not starting the habit  · Making healthy food choices  · Being physically active and gradualy increasing activity levels   · Reduce weight and determine a healthy BMI goal  · Monitor blood pressure and treat if higher than 140/90 mmHg  · Maintain blood total cholesterol levels under 5 mmol/l or 190 mg/dl  · Maintain LDL cholesterol levels under 3.0 mmol/l or 115 mg/dl   · Control blood glucose levels  · Consider taking aspirin (75 mg daily), once blood pressure is controlled   Provided a follow up plan. Time spent (minutes): 5 minutes. Diagnosis Orders   1. Type 2 diabetes mellitus with complication, without long-term current use of insulin (HCC)  Diet, and exercises discussed. POCT Glucose    POCT glycosylated hemoglobin (Hb A1C)    MICROALBUMIN / CREATININE URINE RATIO   2. Essential hypertension  DASH and low sodium diet discussed. amLODIPine (NORVASC) 5 MG tablet    COMPREHENSIVE METABOLIC PANEL    CBC WITH AUTO DIFFERENTIAL    TSH with Reflex    MICROALBUMIN / CREATININE URINE RATIO   3. Muscle spasm  methocarbamol (ROBAXIN) 500 MG tablet  Stretching advised. 4. Prostate cancer screening  Psa screening   5. Mixed hyperlipidemia  Lipid Panel  Heart healthy diet and statin compliance discussed. 6. PAD (peripheral artery disease) ([facility])  Continue pletal.  D/C smoking. Continue to work on complete smoking cessation. Read the literature, take medication if prescribed, contact me if there are further questions. Vascular consult discussed. He wants to hold at this point.

## 2021-09-04 DIAGNOSIS — I10 ESSENTIAL HYPERTENSION: ICD-10-CM

## 2021-09-07 RX ORDER — LOSARTAN POTASSIUM 50 MG/1
TABLET ORAL
Qty: 90 TABLET | Refills: 1 | Status: SHIPPED | OUTPATIENT
Start: 2021-09-07 | End: 2021-12-17

## 2021-10-11 DIAGNOSIS — I73.9 PAD (PERIPHERAL ARTERY DISEASE) (HCC): ICD-10-CM

## 2021-10-11 RX ORDER — CILOSTAZOL 100 MG/1
100 TABLET ORAL 2 TIMES DAILY
Qty: 180 TABLET | Refills: 1 | Status: SHIPPED | OUTPATIENT
Start: 2021-10-11 | End: 2022-05-12

## 2021-12-14 ENCOUNTER — OFFICE VISIT (OUTPATIENT)
Dept: INTERNAL MEDICINE CLINIC | Age: 61
End: 2021-12-14
Payer: MEDICARE

## 2021-12-14 VITALS
BODY MASS INDEX: 29.6 KG/M2 | WEIGHT: 188.6 LBS | DIASTOLIC BLOOD PRESSURE: 65 MMHG | HEIGHT: 67 IN | HEART RATE: 78 BPM | SYSTOLIC BLOOD PRESSURE: 115 MMHG | OXYGEN SATURATION: 97 %

## 2021-12-14 DIAGNOSIS — E11.8 TYPE 2 DIABETES MELLITUS WITH COMPLICATION, WITHOUT LONG-TERM CURRENT USE OF INSULIN (HCC): Primary | ICD-10-CM

## 2021-12-14 DIAGNOSIS — I10 ESSENTIAL HYPERTENSION: ICD-10-CM

## 2021-12-14 DIAGNOSIS — E78.2 MIXED HYPERLIPIDEMIA: ICD-10-CM

## 2021-12-14 DIAGNOSIS — R97.20 ELEVATED PSA: ICD-10-CM

## 2021-12-14 LAB
CHP ED QC CHECK: NORMAL
GLUCOSE BLD-MCNC: 104 MG/DL
HBA1C MFR BLD: 5.6 %

## 2021-12-14 PROCEDURE — 3017F COLORECTAL CA SCREEN DOC REV: CPT | Performed by: INTERNAL MEDICINE

## 2021-12-14 PROCEDURE — 3044F HG A1C LEVEL LT 7.0%: CPT | Performed by: INTERNAL MEDICINE

## 2021-12-14 PROCEDURE — G8427 DOCREV CUR MEDS BY ELIG CLIN: HCPCS | Performed by: INTERNAL MEDICINE

## 2021-12-14 PROCEDURE — G8484 FLU IMMUNIZE NO ADMIN: HCPCS | Performed by: INTERNAL MEDICINE

## 2021-12-14 PROCEDURE — 4004F PT TOBACCO SCREEN RCVD TLK: CPT | Performed by: INTERNAL MEDICINE

## 2021-12-14 PROCEDURE — 2022F DILAT RTA XM EVC RTNOPTHY: CPT | Performed by: INTERNAL MEDICINE

## 2021-12-14 PROCEDURE — 99214 OFFICE O/P EST MOD 30 MIN: CPT | Performed by: INTERNAL MEDICINE

## 2021-12-14 PROCEDURE — 82962 GLUCOSE BLOOD TEST: CPT | Performed by: INTERNAL MEDICINE

## 2021-12-14 PROCEDURE — 83036 HEMOGLOBIN GLYCOSYLATED A1C: CPT | Performed by: INTERNAL MEDICINE

## 2021-12-14 PROCEDURE — G8417 CALC BMI ABV UP PARAM F/U: HCPCS | Performed by: INTERNAL MEDICINE

## 2021-12-14 RX ORDER — AMLODIPINE BESYLATE 5 MG/1
TABLET ORAL
Qty: 90 TABLET | Refills: 3 | Status: SHIPPED | OUTPATIENT
Start: 2021-12-14

## 2021-12-14 ASSESSMENT — PATIENT HEALTH QUESTIONNAIRE - PHQ9
2. FEELING DOWN, DEPRESSED OR HOPELESS: 0
SUM OF ALL RESPONSES TO PHQ QUESTIONS 1-9: 0
SUM OF ALL RESPONSES TO PHQ QUESTIONS 1-9: 0
SUM OF ALL RESPONSES TO PHQ9 QUESTIONS 1 & 2: 0
1. LITTLE INTEREST OR PLEASURE IN DOING THINGS: 0
SUM OF ALL RESPONSES TO PHQ QUESTIONS 1-9: 0

## 2021-12-14 NOTE — PROGRESS NOTES
Ca blker. Gastrointestinal: Negative. Endocrine:        T2 DM, treated with metformin. A1c 5.6. Hyperlipidemia, treated with statin, LDL 97. Genitourinary:        Elevated PSA. See HPI. PSA 7.97. Musculoskeletal: Negative. Skin: Negative. Neurological: Negative. Psychiatric/Behavioral: Negative. Physical Exam  Constitutional:       General: He is not in acute distress. Appearance: He is well-developed. HENT:      Head: Normocephalic and atraumatic. Right Ear: External ear normal.      Left Ear: External ear normal.      Nose: Nose normal.   Eyes:      General: No scleral icterus. Conjunctiva/sclera: Conjunctivae normal.      Pupils: Pupils are equal, round, and reactive to light. Neck:      Thyroid: No thyromegaly. Cardiovascular:      Rate and Rhythm: Normal rate and regular rhythm. Heart sounds: Normal heart sounds. Pulmonary:      Effort: Pulmonary effort is normal.      Breath sounds: Normal breath sounds. Abdominal:      General: Bowel sounds are normal.      Palpations: Abdomen is soft. There is no mass. Musculoskeletal:      Cervical back: Normal range of motion and neck supple. Lymphadenopathy:      Cervical: No cervical adenopathy. Skin:     General: Skin is warm and dry. Neurological:      Mental Status: He is alert and oriented to person, place, and time. Deep Tendon Reflexes: Reflexes are normal and symmetric. Psychiatric:         Behavior: Behavior normal.         Thought Content: Thought content normal.         Judgment: Judgment normal.         Vitals:    12/14/21 0936   BP: 115/65   Pulse: 78   SpO2: 97%       Assessment:   Diagnosis Orders   1. Type 2 diabetes mellitus with complication, without long-term current use of insulin (HCC)  Diet, physical activity and weight reduction discussed. POCT Glucose    POCT glycosylated hemoglobin (Hb A1C)    metFORMIN (GLUCOPHAGE) 500 MG tablet   2.  Essential hypertension  amLODIPine (NORVASC) 5 MG tablet  DASH and low sodium diet discussed. 3. Elevated PSA  AFL - Rhona Pereira MD, Urology, [facility]   4. Mixed hyperlipidemia  Heart healthy diet and statin compliance discussed. Plan:  See plans above.

## 2021-12-17 DIAGNOSIS — I10 ESSENTIAL HYPERTENSION: ICD-10-CM

## 2021-12-17 RX ORDER — LOSARTAN POTASSIUM 50 MG/1
TABLET ORAL
Qty: 90 TABLET | Refills: 1 | Status: SHIPPED | OUTPATIENT
Start: 2021-12-17 | End: 2022-08-22

## 2021-12-20 ASSESSMENT — ENCOUNTER SYMPTOMS
RESPIRATORY NEGATIVE: 1
GASTROINTESTINAL NEGATIVE: 1
EYES NEGATIVE: 1

## 2022-01-11 ENCOUNTER — TELEPHONE (OUTPATIENT)
Dept: INTERNAL MEDICINE CLINIC | Age: 62
End: 2022-01-11

## 2022-01-11 NOTE — LETTER
[facility] Internal Medicine  [address] [place] [address] Drive  [address] [facility]  Phone: [phone]  Fax: [phone]    Cecilia Copeland MD        January 11, 2022     Patient: Juan Donaldson   YOB: 1960   Date of Visit: 1/11/2022       To Whom It May Concern: It is my medical opinion that Juan Donaldson STOP the aspirin 7 days before his scheduled biopsy. If you have any questions or concerns, please don't hesitate to call.     Sincerely,        Cecilia Copeland MD

## 2022-01-11 NOTE — TELEPHONE ENCOUNTER
Procedure for biopsy of prostate is scheduled for January 25th and patient will need to stop taking aspirin 7 days before. Requesting office please write physician ok with patient stopping medication for this and and fax to [phone].

## 2022-02-02 DIAGNOSIS — M62.838 MUSCLE SPASM: ICD-10-CM

## 2022-02-02 RX ORDER — METHOCARBAMOL 500 MG/1
500 TABLET, FILM COATED ORAL 2 TIMES DAILY PRN
Qty: 60 TABLET | Refills: 1 | Status: SHIPPED | OUTPATIENT
Start: 2022-02-02 | End: 2022-03-28

## 2022-03-27 DIAGNOSIS — M62.838 MUSCLE SPASM: ICD-10-CM

## 2022-03-28 RX ORDER — METHOCARBAMOL 500 MG/1
500 TABLET, FILM COATED ORAL 2 TIMES DAILY PRN
Qty: 60 TABLET | Refills: 1 | Status: SHIPPED | OUTPATIENT
Start: 2022-03-28 | End: 2022-05-12

## 2022-05-11 ENCOUNTER — TELEPHONE (OUTPATIENT)
Dept: INTERNAL MEDICINE CLINIC | Age: 62
End: 2022-05-11

## 2022-05-11 DIAGNOSIS — I73.9 PAD (PERIPHERAL ARTERY DISEASE) (HCC): ICD-10-CM

## 2022-05-11 DIAGNOSIS — G62.9 NEUROPATHY: ICD-10-CM

## 2022-05-11 DIAGNOSIS — M62.838 MUSCLE SPASM: ICD-10-CM

## 2022-05-11 RX ORDER — GABAPENTIN 100 MG/1
100 CAPSULE ORAL 3 TIMES DAILY PRN
Qty: 90 CAPSULE | Refills: 1 | Status: SHIPPED | OUTPATIENT
Start: 2022-05-11 | End: 2022-09-29

## 2022-05-11 NOTE — TELEPHONE ENCOUNTER
Patient needs a refill on Gabapentin 100 mg (he wasn't sure what the medication was called but he indicated the nerve pill). He said the pharmacy told him that the doctor took him off this medication.   Please advise

## 2022-05-12 RX ORDER — METHOCARBAMOL 500 MG/1
500 TABLET, FILM COATED ORAL 2 TIMES DAILY PRN
Qty: 60 TABLET | Refills: 1 | Status: SHIPPED | OUTPATIENT
Start: 2022-05-12

## 2022-05-12 RX ORDER — CILOSTAZOL 100 MG/1
100 TABLET ORAL 2 TIMES DAILY
Qty: 180 TABLET | Refills: 1 | Status: SHIPPED | OUTPATIENT
Start: 2022-05-12

## 2022-05-27 ENCOUNTER — OFFICE VISIT (OUTPATIENT)
Dept: INTERNAL MEDICINE CLINIC | Age: 62
End: 2022-05-27
Payer: MEDICARE

## 2022-05-27 VITALS
TEMPERATURE: 97.5 F | HEIGHT: 65 IN | BODY MASS INDEX: 31.79 KG/M2 | DIASTOLIC BLOOD PRESSURE: 60 MMHG | WEIGHT: 190.8 LBS | HEART RATE: 75 BPM | SYSTOLIC BLOOD PRESSURE: 110 MMHG | OXYGEN SATURATION: 98 %

## 2022-05-27 DIAGNOSIS — I73.9 PAD (PERIPHERAL ARTERY DISEASE) (HCC): ICD-10-CM

## 2022-05-27 DIAGNOSIS — I10 PRIMARY HYPERTENSION: ICD-10-CM

## 2022-05-27 DIAGNOSIS — E11.8 TYPE 2 DIABETES MELLITUS WITH COMPLICATION, WITHOUT LONG-TERM CURRENT USE OF INSULIN (HCC): Primary | ICD-10-CM

## 2022-05-27 DIAGNOSIS — R06.02 SOB (SHORTNESS OF BREATH): ICD-10-CM

## 2022-05-27 DIAGNOSIS — F17.219 CIGARETTE NICOTINE DEPENDENCE WITH NICOTINE-INDUCED DISORDER: ICD-10-CM

## 2022-05-27 DIAGNOSIS — E78.2 MIXED HYPERLIPIDEMIA: ICD-10-CM

## 2022-05-27 DIAGNOSIS — R97.20 ELEVATED PSA: ICD-10-CM

## 2022-05-27 LAB
CHP ED QC CHECK: NORMAL
GLUCOSE BLD-MCNC: 106 MG/DL
HBA1C MFR BLD: 5.8 %

## 2022-05-27 PROCEDURE — G8417 CALC BMI ABV UP PARAM F/U: HCPCS | Performed by: INTERNAL MEDICINE

## 2022-05-27 PROCEDURE — 82962 GLUCOSE BLOOD TEST: CPT | Performed by: INTERNAL MEDICINE

## 2022-05-27 PROCEDURE — 83036 HEMOGLOBIN GLYCOSYLATED A1C: CPT | Performed by: INTERNAL MEDICINE

## 2022-05-27 PROCEDURE — 4004F PT TOBACCO SCREEN RCVD TLK: CPT | Performed by: INTERNAL MEDICINE

## 2022-05-27 PROCEDURE — 99215 OFFICE O/P EST HI 40 MIN: CPT | Performed by: INTERNAL MEDICINE

## 2022-05-27 PROCEDURE — G8427 DOCREV CUR MEDS BY ELIG CLIN: HCPCS | Performed by: INTERNAL MEDICINE

## 2022-05-27 PROCEDURE — 2022F DILAT RTA XM EVC RTNOPTHY: CPT | Performed by: INTERNAL MEDICINE

## 2022-05-27 PROCEDURE — 3017F COLORECTAL CA SCREEN DOC REV: CPT | Performed by: INTERNAL MEDICINE

## 2022-05-27 PROCEDURE — 3044F HG A1C LEVEL LT 7.0%: CPT | Performed by: INTERNAL MEDICINE

## 2022-05-27 ASSESSMENT — ANXIETY QUESTIONNAIRES
6. BECOMING EASILY ANNOYED OR IRRITABLE: 0
7. FEELING AFRAID AS IF SOMETHING AWFUL MIGHT HAPPEN: 0
IF YOU CHECKED OFF ANY PROBLEMS ON THIS QUESTIONNAIRE, HOW DIFFICULT HAVE THESE PROBLEMS MADE IT FOR YOU TO DO YOUR WORK, TAKE CARE OF THINGS AT HOME, OR GET ALONG WITH OTHER PEOPLE: NOT DIFFICULT AT ALL
4. TROUBLE RELAXING: 0
2. NOT BEING ABLE TO STOP OR CONTROL WORRYING: 0
3. WORRYING TOO MUCH ABOUT DIFFERENT THINGS: 0
GAD7 TOTAL SCORE: 0
1. FEELING NERVOUS, ANXIOUS, OR ON EDGE: 0
5. BEING SO RESTLESS THAT IT IS HARD TO SIT STILL: 0

## 2022-05-27 ASSESSMENT — PATIENT HEALTH QUESTIONNAIRE - PHQ9
SUM OF ALL RESPONSES TO PHQ QUESTIONS 1-9: 0
SUM OF ALL RESPONSES TO PHQ QUESTIONS 1-9: 0
SUM OF ALL RESPONSES TO PHQ9 QUESTIONS 1 & 2: 0
2. FEELING DOWN, DEPRESSED OR HOPELESS: 0
SUM OF ALL RESPONSES TO PHQ QUESTIONS 1-9: 0
1. LITTLE INTEREST OR PLEASURE IN DOING THINGS: 0
SUM OF ALL RESPONSES TO PHQ QUESTIONS 1-9: 0

## 2022-05-27 NOTE — PATIENT INSTRUCTIONS
[facility] Locations - No appointment necessary. @ indicates the location is open Saturdays in addition to Monday through Friday. Call your preferred location for test preparation, business hours and other information you need. [employer] accepts [employer]. [facility]    @ [place] [facility] [address] [facility] [place], [address]   Ph: [phone] [facility] Svcs. [address]   Ph: [phone]  @ [place] [address] Lab Svcs[address]   Ph: [phone]    [facility] Sv[facility]. [address] [place] [address]   Ph: [phone] @ [place] [facility] [address]  Ph: [phone] @ [facility] Lab Svcs. [facility] [address]   Ph: [phone]    [place]   @ [facility] Svcs. [facility] [address] [place], [place] [address]   Ph: [phone] [facility] [address] [place] [address]  Ph: [phone] [place] [phone], [phone]   [facility] [address] Lab Svcs[address] [facility]   Ph: [phone] [facility]. Lab Svcs.   [address]   Ph: [phone]

## 2022-05-27 NOTE — PROGRESS NOTES
Patient: Troy Rajput is a 59 y.o. male who presents today with the following Chief Complaint(s):    Chief Complaint   Patient presents with    Diabetes         HPIneeded 300 dollars for biopsy. Encouraged moving forward. Still smokes. Has not done CT lung. Calf burning with walking. 1/2 blk and than stops. Shortness of breath also. Vascular f/u. Current Outpatient Medications   Medication Sig Dispense Refill    methocarbamol (ROBAXIN) 500 MG tablet TAKE 1 TABLET BY MOUTH 2 TIMES DAILY AS NEEDED (MUSCLE SPASM) 60 tablet 1    cilostazol (PLETAL) 100 MG tablet TAKE 1 TABLET BY MOUTH 2 TIMES DAILY 180 tablet 1    gabapentin (NEURONTIN) 100 MG capsule Take 1 capsule by mouth 3 times daily as needed (nerve pain.) for up to 30 days. 90 capsule 1    losartan (COZAAR) 50 MG tablet TAKE 1 TABLET BY MOUTH EVERY DAY 90 tablet 1    amLODIPine (NORVASC) 5 MG tablet TAKE 1 TABLET BY MOUTH EVERY DAY 90 tablet 3    allopurinol (ZYLOPRIM) 300 MG tablet TAKE 1 TABLET BY MOUTH DAILY FOR GOUT 90 tablet 3    atorvastatin (LIPITOR) 40 MG tablet Take 1 tablet by mouth daily 90 tablet 3    colchicine (MITIGARE) 0.6 MG capsule Lot [id number] Exp 5/2020 28 capsule 0    metFORMIN (GLUCOPHAGE) 500 MG tablet Take 1 tablet by mouth daily (with breakfast) for diabetes. 90 tablet 3    aspirin 81 MG tablet Take 1 tablet by mouth daily 30 tablet 11     No current facility-administered medications for this visit. Patient's past medical history, surgical history, family history, medications,and allergies  were all reviewed and updated as appropriate today. Review of Systems      Physical Exam    Vitals:    05/27/22 0912   BP: (!) 140/88   Pulse: 75   Temp: 97.5 °F (36.4 °C)   SpO2: 98%       Assessment:  Encounter Diagnosis   Name Primary?  Type 2 diabetes mellitus with complication, without long-term current use of insulin ([facility] [address] Yes       Plan:  1.  Type 2 diabetes mellitus with complication, without long-term current use of insulin (HCC)  ***  - POCT Glucose  - POCT glycosylated hemoglobin (Hb A1C)

## 2022-05-31 ASSESSMENT — ENCOUNTER SYMPTOMS
EYES NEGATIVE: 1
SHORTNESS OF BREATH: 1
GASTROINTESTINAL NEGATIVE: 1

## 2022-05-31 NOTE — PROGRESS NOTES
Patient: Radha Burroughs is a 59 y.o. male who presents today with the following Chief Complaint(s):    Chief Complaint   Patient presents with    Diabetes         Diabetes  Pertinent negatives for diabetes include no chest pain. He is here for a check up and F/U on hypertension, hyperlipidemia, diabetes, type 2. He is taking medication as prescribed, is aware of the importance of healthy eating and regular physical activity. H/O elevated PSA. No LUTS. Saw urology, biopsy planned but needed 300 dollars upfront which he did have. He does continue to smoke cigarettes despite repeated counseling. Has not completed CT lung screen as of yet. H/O PAD. Calves burn after about 1/2 blk ambulation. Taking pletal. Shortness of breath also with exertion. No CP. Current Outpatient Medications   Medication Sig Dispense Refill    methocarbamol (ROBAXIN) 500 MG tablet TAKE 1 TABLET BY MOUTH 2 TIMES DAILY AS NEEDED (MUSCLE SPASM) 60 tablet 1    cilostazol (PLETAL) 100 MG tablet TAKE 1 TABLET BY MOUTH 2 TIMES DAILY 180 tablet 1    gabapentin (NEURONTIN) 100 MG capsule Take 1 capsule by mouth 3 times daily as needed (nerve pain.) for up to 30 days. 90 capsule 1    losartan (COZAAR) 50 MG tablet TAKE 1 TABLET BY MOUTH EVERY DAY 90 tablet 1    amLODIPine (NORVASC) 5 MG tablet TAKE 1 TABLET BY MOUTH EVERY DAY 90 tablet 3    allopurinol (ZYLOPRIM) 300 MG tablet TAKE 1 TABLET BY MOUTH DAILY FOR GOUT 90 tablet 3    atorvastatin (LIPITOR) 40 MG tablet Take 1 tablet by mouth daily 90 tablet 3    colchicine (MITIGARE) 0.6 MG capsule Lot [id number] Exp 5/2020 28 capsule 0    metFORMIN (GLUCOPHAGE) 500 MG tablet Take 1 tablet by mouth daily (with breakfast) for diabetes. 90 tablet 3    aspirin 81 MG tablet Take 1 tablet by mouth daily 30 tablet 11     No current facility-administered medications for this visit.        Patient's past medical history, surgical history, family history, medications,and allergies  were all reviewed and updated as appropriate today. Review of Systems   Constitutional: Negative. HENT: Negative. Eyes: Negative. Respiratory: Positive for shortness of breath. Cardiovascular: Negative for chest pain. Hypertension, treated with ARB, Ca blker. PAD, see HPI. Gastrointestinal: Negative. Endocrine:        T2 DM, treated with metformin. A1c 5.8. Hyperlipidemia, treated with statin, LDL 97. Genitourinary:        Elevated PSA. See HPI. PSA 7.97. see HPI. Musculoskeletal: Negative. Skin: Negative. Neurological: Negative. Psychiatric/Behavioral: Negative. Physical Exam  Constitutional:       General: He is not in acute distress. Appearance: He is well-developed. HENT:      Head: Normocephalic and atraumatic. Right Ear: External ear normal.      Left Ear: External ear normal.      Nose: Nose normal.   Eyes:      General: No scleral icterus. Conjunctiva/sclera: Conjunctivae normal.      Pupils: Pupils are equal, round, and reactive to light. Neck:      Thyroid: No thyromegaly. Cardiovascular:      Rate and Rhythm: Normal rate and regular rhythm. Heart sounds: Normal heart sounds. Pulmonary:      Effort: Pulmonary effort is normal.      Breath sounds: Normal breath sounds. Abdominal:      General: Bowel sounds are normal.      Palpations: Abdomen is soft. There is no mass. Musculoskeletal:      Cervical back: Normal range of motion and neck supple. Lymphadenopathy:      Cervical: No cervical adenopathy. Skin:     General: Skin is warm and dry. Neurological:      Mental Status: He is alert and oriented to person, place, and time. Deep Tendon Reflexes: Reflexes are normal and symmetric. Psychiatric:         Behavior: Behavior normal.         Thought Content:  Thought content normal.         Judgment: Judgment normal.         Vitals:    05/27/22 1007   BP: 110/60   Pulse:    Temp:    SpO2: Assessment:   Diagnosis Orders   1. Type 2 diabetes mellitus with complication, without long-term current use of insulin (HCC)  Diet, physical activity and weight reduction discussed. POCT Glucose    POCT glycosylated hemoglobin (Hb A1C)    metFORMIN (GLUCOPHAGE) 500 MG tablet   2. Essential hypertension  amLODIPine (NORVASC) 5 MG tablet  DASH and low sodium diet discussed. 3. Elevated PSA  AFL - Court Gannon MD, Urology, [place]  Advised to proceed with biopsy. 4. Mixed hyperlipidemia  Heart healthy diet and statin compliance discussed. 5.      PAD: no improvement with pletal. Discontinue. 6.      Cigarette nicotine dependence: Continue to work on complete smoking cessation. Read the literature, take medication if prescribed, contact me if there are further questions. CT lung screen discussed. 7.      SOB: clinically no CHF. Long smoking history. Needs PFTs. I spent greater than 40 minutes with this patient including history, physical, assessment, plan, and discussion. Plan:  See plans above.

## 2022-06-01 ENCOUNTER — TELEPHONE (OUTPATIENT)
Dept: VASCULAR SURGERY | Age: 62
End: 2022-06-01

## 2022-06-01 DIAGNOSIS — I73.9 CLAUDICATION IN PERIPHERAL VASCULAR DISEASE (HCC): Primary | ICD-10-CM

## 2022-06-01 PROBLEM — J30.9 ALLERGIC RHINITIS: Status: ACTIVE | Noted: 2022-06-01

## 2022-06-01 PROBLEM — M10.9 GOUT: Status: ACTIVE | Noted: 2022-06-01

## 2022-06-01 PROBLEM — F17.200 SMOKING ADDICTION: Status: ACTIVE | Noted: 2022-06-01

## 2022-07-27 ENCOUNTER — OFFICE VISIT (OUTPATIENT)
Dept: INTERNAL MEDICINE CLINIC | Age: 62
End: 2022-07-27
Payer: MEDICARE

## 2022-07-27 VITALS
OXYGEN SATURATION: 99 % | DIASTOLIC BLOOD PRESSURE: 70 MMHG | TEMPERATURE: 97 F | WEIGHT: 186.2 LBS | HEART RATE: 73 BPM | HEIGHT: 67 IN | BODY MASS INDEX: 29.22 KG/M2 | SYSTOLIC BLOOD PRESSURE: 130 MMHG

## 2022-07-27 DIAGNOSIS — R97.20 ELEVATED PSA: ICD-10-CM

## 2022-07-27 DIAGNOSIS — H61.23 BILATERAL IMPACTED CERUMEN: Primary | ICD-10-CM

## 2022-07-27 DIAGNOSIS — F17.219 CIGARETTE NICOTINE DEPENDENCE WITH NICOTINE-INDUCED DISORDER: ICD-10-CM

## 2022-07-27 DIAGNOSIS — E11.8 TYPE 2 DIABETES WITH COMPLICATION (HCC): ICD-10-CM

## 2022-07-27 DIAGNOSIS — I73.9 PAD (PERIPHERAL ARTERY DISEASE) (HCC): ICD-10-CM

## 2022-07-27 DIAGNOSIS — R06.02 SOB (SHORTNESS OF BREATH): ICD-10-CM

## 2022-07-27 DIAGNOSIS — E78.2 MIXED HYPERLIPIDEMIA: ICD-10-CM

## 2022-07-27 DIAGNOSIS — I10 PRIMARY HYPERTENSION: ICD-10-CM

## 2022-07-27 PROCEDURE — 2022F DILAT RTA XM EVC RTNOPTHY: CPT | Performed by: INTERNAL MEDICINE

## 2022-07-27 PROCEDURE — G8427 DOCREV CUR MEDS BY ELIG CLIN: HCPCS | Performed by: INTERNAL MEDICINE

## 2022-07-27 PROCEDURE — 3044F HG A1C LEVEL LT 7.0%: CPT | Performed by: INTERNAL MEDICINE

## 2022-07-27 PROCEDURE — 3017F COLORECTAL CA SCREEN DOC REV: CPT | Performed by: INTERNAL MEDICINE

## 2022-07-27 PROCEDURE — G8417 CALC BMI ABV UP PARAM F/U: HCPCS | Performed by: INTERNAL MEDICINE

## 2022-07-27 PROCEDURE — 99214 OFFICE O/P EST MOD 30 MIN: CPT | Performed by: INTERNAL MEDICINE

## 2022-07-27 PROCEDURE — 4004F PT TOBACCO SCREEN RCVD TLK: CPT | Performed by: INTERNAL MEDICINE

## 2022-07-27 ASSESSMENT — PATIENT HEALTH QUESTIONNAIRE - PHQ9
SUM OF ALL RESPONSES TO PHQ QUESTIONS 1-9: 0
SUM OF ALL RESPONSES TO PHQ9 QUESTIONS 1 & 2: 0
1. LITTLE INTEREST OR PLEASURE IN DOING THINGS: 0
SUM OF ALL RESPONSES TO PHQ QUESTIONS 1-9: 0
2. FEELING DOWN, DEPRESSED OR HOPELESS: 0

## 2022-07-27 NOTE — PROGRESS NOTES
Patient: Se Goodrich is a 57 y.o. male who presents today with the following Chief Complaint(s):    Chief Complaint   Patient presents with    Follow-up    Otalgia     Right side x4 days          HPIPAD with claudication. Call and schedule. Has had doppler. [phone]  Wax in ear right uses qtips. Current Outpatient Medications   Medication Sig Dispense Refill    methocarbamol (ROBAXIN) 500 MG tablet TAKE 1 TABLET BY MOUTH 2 TIMES DAILY AS NEEDED (MUSCLE SPASM) 60 tablet 1    cilostazol (PLETAL) 100 MG tablet TAKE 1 TABLET BY MOUTH 2 TIMES DAILY 180 tablet 1    losartan (COZAAR) 50 MG tablet TAKE 1 TABLET BY MOUTH EVERY DAY 90 tablet 1    amLODIPine (NORVASC) 5 MG tablet TAKE 1 TABLET BY MOUTH EVERY DAY 90 tablet 3    allopurinol (ZYLOPRIM) 300 MG tablet TAKE 1 TABLET BY MOUTH DAILY FOR GOUT 90 tablet 3    atorvastatin (LIPITOR) 40 MG tablet Take 1 tablet by mouth daily 90 tablet 3    colchicine (MITIGARE) 0.6 MG capsule Lot [id number] Exp 5/2020 28 capsule 0    gabapentin (NEURONTIN) 100 MG capsule Take 1 capsule by mouth 3 times daily as needed (nerve pain.) for up to 30 days. 90 capsule 1    metFORMIN (GLUCOPHAGE) 500 MG tablet Take 1 tablet by mouth daily (with breakfast) for diabetes. 90 tablet 3    aspirin 81 MG tablet Take 1 tablet by mouth daily 30 tablet 11     No current facility-administered medications for this visit. Patient's past medical history, surgical history, family history, medications,and allergies  were all reviewed and updated as appropriate today. Review of Systems      Physical Exam    Vitals:    07/27/22 0904   BP: (!) 162/76   Pulse:    Temp:    SpO2:        Assessment:  No diagnosis found. Plan:  There are no diagnoses linked to this encounter.

## 2022-07-27 NOTE — PATIENT INSTRUCTIONS
[facility] Laboratory Locations - No appointment necessary. @ indicates the location is open Saturdays in addition to Monday through Friday. Call your preferred location for test preparation, business hours and other information you need. [employer] accepts [employer]. [facility]    @ [place] [facility] 3 [facility] [id number] [place] [address]. [place], [address]   Ph: [phone] [facility] Lab Svcs. [address]   Ph: [phone]  @ [place] Leyden Lab Svcs. [address] Chanetta Leyden, [facility]   Ph: [phone]    [facility] Svcs. 409 [facility] [place], [address]   Ph: [phone] @ [place] [facility] Svcs. [address]  Ph: [phone] @ [place] [facility] Lab Svcs. 835 [facility]. [place], [place] [facility] 429   Ph: [phone]     Janie Tellez [facility]. [facility], [address]  Ph: [phone]    [place]   @ [place] [facility] Svcs. [address] [place], [place] [address]   Ph: [phone] [place] [facility] [address] [facility], 800 [facility]  Ph: [address] [place] [id number] [place] [address]:  [address] Lab Svcs[address] [facility]   Ph: [phone] [facility]. Lab Svcs. [address] [place], [place] [address]   Ph: [phone]      Tylenol for pain. Aleve for pain twice daily as needed.

## 2022-08-16 DIAGNOSIS — E78.5 HYPERLIPIDEMIA, UNSPECIFIED HYPERLIPIDEMIA TYPE: ICD-10-CM

## 2022-08-16 RX ORDER — ATORVASTATIN CALCIUM 40 MG/1
TABLET, FILM COATED ORAL
Qty: 90 TABLET | Refills: 3 | Status: SHIPPED | OUTPATIENT
Start: 2022-08-16

## 2022-08-19 DIAGNOSIS — I10 ESSENTIAL HYPERTENSION: ICD-10-CM

## 2022-08-22 RX ORDER — LOSARTAN POTASSIUM 50 MG/1
TABLET ORAL
Qty: 90 TABLET | Refills: 1 | Status: SHIPPED | OUTPATIENT
Start: 2022-08-22

## 2022-08-26 ASSESSMENT — ENCOUNTER SYMPTOMS
EYES NEGATIVE: 1
SHORTNESS OF BREATH: 1
GASTROINTESTINAL NEGATIVE: 1

## 2022-08-27 NOTE — PROGRESS NOTES
Patient: Swati Villa is a 57 y.o. male who presents today with the following Chief Complaint(s):    Chief Complaint   Patient presents with    Follow-up    Otalgia     Right side x4 days          Diabetes  Pertinent negatives for diabetes include no chest pain. Otalgia    He is here for a check up and F/U on hypertension, hyperlipidemia, diabetes, type 2. He is taking medication as prescribed, is aware of the importance of healthy eating and regular physical activity. H/O elevated PSA. No LUTS. Saw urology, biopsy planned but needed 300 dollars upfront which he did have. He does continue to smoke cigarettes despite repeated counseling. Has not completed CT lung screen as of yet. H/O PAD. Calves burn after about 1/2 blk ambulation. Taking pletal. Shortness of breath also with exertion. No CP. Right ear pain. No drainage or sore throat. Endorses using Q tip to dig in ear. Current Outpatient Medications   Medication Sig Dispense Refill    methocarbamol (ROBAXIN) 500 MG tablet TAKE 1 TABLET BY MOUTH 2 TIMES DAILY AS NEEDED (MUSCLE SPASM) 60 tablet 1    cilostazol (PLETAL) 100 MG tablet TAKE 1 TABLET BY MOUTH 2 TIMES DAILY 180 tablet 1    amLODIPine (NORVASC) 5 MG tablet TAKE 1 TABLET BY MOUTH EVERY DAY 90 tablet 3    allopurinol (ZYLOPRIM) 300 MG tablet TAKE 1 TABLET BY MOUTH DAILY FOR GOUT 90 tablet 3    colchicine (MITIGARE) 0.6 MG capsule Lot [id number] Exp 5/2020 28 capsule 0    losartan (COZAAR) 50 MG tablet TAKE 1 TABLET BY MOUTH EVERY DAY 90 tablet 1    atorvastatin (LIPITOR) 40 MG tablet TAKE 1 TABLET BY MOUTH EVERY DAY 90 tablet 3    gabapentin (NEURONTIN) 100 MG capsule Take 1 capsule by mouth 3 times daily as needed (nerve pain.) for up to 30 days. 90 capsule 1    metFORMIN (GLUCOPHAGE) 500 MG tablet Take 1 tablet by mouth daily (with breakfast) for diabetes.  90 tablet 3    aspirin 81 MG tablet Take 1 tablet by mouth daily 30 tablet 11     No current facility-administered medications for this visit. Patient's past medical history, surgical history, family history, medications,and allergies  were all reviewed and updated as appropriate today. Review of Systems   Constitutional: Negative. HENT:  Positive for ear pain. Eyes: Negative. Respiratory:  Positive for shortness of breath. Cardiovascular:  Negative for chest pain. Hypertension, treated with ARB, Ca blker. PAD, see HPI. Gastrointestinal: Negative. Endocrine:        T2 DM, treated with metformin. Latest A1c 5.8. Hyperlipidemia, treated with statin, LDL 97. Genitourinary:         Elevated PSA. See HPI. PSA 7.97. see HPI. Musculoskeletal: Negative. Skin: Negative. Neurological: Negative. Psychiatric/Behavioral: Negative. Physical Exam  Constitutional:       General: He is not in acute distress. Appearance: He is well-developed. HENT:      Head: Normocephalic and atraumatic. Comments: Bilateral impaction. Right Ear: External ear normal.      Left Ear: External ear normal.      Nose: Nose normal.   Eyes:      General: No scleral icterus. Conjunctiva/sclera: Conjunctivae normal.      Pupils: Pupils are equal, round, and reactive to light. Neck:      Thyroid: No thyromegaly. Cardiovascular:      Rate and Rhythm: Normal rate and regular rhythm. Heart sounds: Normal heart sounds. Pulmonary:      Effort: Pulmonary effort is normal.      Breath sounds: Normal breath sounds. Abdominal:      General: Bowel sounds are normal.      Palpations: Abdomen is soft. There is no mass. Musculoskeletal:      Cervical back: Normal range of motion and neck supple. Lymphadenopathy:      Cervical: No cervical adenopathy. Skin:     General: Skin is warm and dry. Neurological:      Mental Status: He is alert and oriented to person, place, and time. Deep Tendon Reflexes: Reflexes are normal and symmetric.    Psychiatric: Behavior: Behavior normal.         Thought Content: Thought content normal.         Judgment: Judgment normal.       Vitals:    07/27/22 0927   BP: 130/70   Pulse:    Temp:    SpO2:        Assessment:   Diagnosis Orders   1. Type 2 diabetes mellitus with complication, without long-term current use of insulin (HCC)  Diet, physical activity and weight reduction discussed. POCT Glucose    POCT glycosylated hemoglobin (Hb A1C)    metFORMIN (GLUCOPHAGE) 500 MG tablet   2. Essential hypertension  amLODIPine (NORVASC) 5 MG tablet  DASH and low sodium diet discussed. 3. Elevated PSA  JOEY - Denita Blount MD, Urology, [facility]  Advised to proceed with biopsy. 4. Mixed hyperlipidemia  Heart healthy diet and statin compliance discussed. 5.      PAD: no improvement with pletal. Discontinue. Vascular surgeon consultation. 6.      Cigarette nicotine dependence: Continue to work on complete smoking cessation. Read the literature, take medication if prescribed, contact me if there are further questions. CT lung screen discussed. 7.     SOB: clinically no CHF. Long smoking history. Needs PFTs. 8.     Right ear ache. Cerumen impaction. ENT for evaluation and treatment. Plan:  See plans above.

## 2022-09-07 ENCOUNTER — OFFICE VISIT (OUTPATIENT)
Dept: INTERNAL MEDICINE CLINIC | Age: 62
End: 2022-09-07
Payer: MEDICARE

## 2022-09-07 VITALS
OXYGEN SATURATION: 99 % | HEART RATE: 76 BPM | DIASTOLIC BLOOD PRESSURE: 72 MMHG | BODY MASS INDEX: 27.94 KG/M2 | SYSTOLIC BLOOD PRESSURE: 118 MMHG | HEIGHT: 67 IN | WEIGHT: 178 LBS

## 2022-09-07 DIAGNOSIS — F17.219 CIGARETTE NICOTINE DEPENDENCE WITH NICOTINE-INDUCED DISORDER: ICD-10-CM

## 2022-09-07 DIAGNOSIS — R97.20 ELEVATED PSA: ICD-10-CM

## 2022-09-07 DIAGNOSIS — I73.9 PAD (PERIPHERAL ARTERY DISEASE) (HCC): ICD-10-CM

## 2022-09-07 DIAGNOSIS — E78.2 MIXED HYPERLIPIDEMIA: ICD-10-CM

## 2022-09-07 DIAGNOSIS — I10 PRIMARY HYPERTENSION: ICD-10-CM

## 2022-09-07 DIAGNOSIS — E11.8 TYPE 2 DIABETES WITH COMPLICATION (HCC): Primary | ICD-10-CM

## 2022-09-07 LAB
CHP ED QC CHECK: NORMAL
GLUCOSE BLD-MCNC: 107 MG/DL
HBA1C MFR BLD: 5.6 %

## 2022-09-07 PROCEDURE — 3017F COLORECTAL CA SCREEN DOC REV: CPT | Performed by: INTERNAL MEDICINE

## 2022-09-07 PROCEDURE — 3044F HG A1C LEVEL LT 7.0%: CPT | Performed by: INTERNAL MEDICINE

## 2022-09-07 PROCEDURE — 4004F PT TOBACCO SCREEN RCVD TLK: CPT | Performed by: INTERNAL MEDICINE

## 2022-09-07 PROCEDURE — 83036 HEMOGLOBIN GLYCOSYLATED A1C: CPT | Performed by: INTERNAL MEDICINE

## 2022-09-07 PROCEDURE — 82962 GLUCOSE BLOOD TEST: CPT | Performed by: INTERNAL MEDICINE

## 2022-09-07 PROCEDURE — 2022F DILAT RTA XM EVC RTNOPTHY: CPT | Performed by: INTERNAL MEDICINE

## 2022-09-07 PROCEDURE — 99214 OFFICE O/P EST MOD 30 MIN: CPT | Performed by: INTERNAL MEDICINE

## 2022-09-07 PROCEDURE — G8428 CUR MEDS NOT DOCUMENT: HCPCS | Performed by: INTERNAL MEDICINE

## 2022-09-07 PROCEDURE — G8417 CALC BMI ABV UP PARAM F/U: HCPCS | Performed by: INTERNAL MEDICINE

## 2022-09-07 SDOH — ECONOMIC STABILITY: FOOD INSECURITY: WITHIN THE PAST 12 MONTHS, THE FOOD YOU BOUGHT JUST DIDN'T LAST AND YOU DIDN'T HAVE MONEY TO GET MORE.: NEVER TRUE

## 2022-09-07 SDOH — ECONOMIC STABILITY: FOOD INSECURITY: WITHIN THE PAST 12 MONTHS, YOU WORRIED THAT YOUR FOOD WOULD RUN OUT BEFORE YOU GOT MONEY TO BUY MORE.: NEVER TRUE

## 2022-09-07 ASSESSMENT — SOCIAL DETERMINANTS OF HEALTH (SDOH): HOW HARD IS IT FOR YOU TO PAY FOR THE VERY BASICS LIKE FOOD, HOUSING, MEDICAL CARE, AND HEATING?: NOT HARD AT ALL

## 2022-09-07 NOTE — PATIENT INSTRUCTIONS
[facility] Laboratory Locations - No appointment necessary. @ indicates the location is open Saturdays in addition to Monday through Friday. Call your preferred location for test preparation, business hours and other information you need. [employer] accepts [employer]. [facility]    @ [place] [facility] Svcs. 3 [facility] [id number] [place] [address]. [place], [address]   Ph: [phone] [facility] Lab Svcs. [address]   Ph: [phone]  @ [place] [facility] Svcs. 3155 [facility]   Ph: [phone]    [facility] [address] [facility] [address]   Ph: [phone] @ [place] [facility] Svcs. [address]  Ph: [phone] @ [facility] Lab Svcs. [address]   Ph: [phone]     [facility]vcs. [place] [address]  Ph: [phone]    [place]   @ [place] Lab Svcs. [address] [facility] [address] [place], [place] [address]   Ph: [phone] [facility] Med. Office Bldg. [address] [facility] 800 [facility]  Ph: [address] [place] [phone]   [facility]:  [address] Lab Svcs[address] 54 [facility]   Ph: [phone] [facility]. Lab Svcs.   211 [facility], [address] [facility] [address]   Ph: [phone]

## 2022-09-07 NOTE — PROGRESS NOTES
Patient: Escobar Cortés is a 57 y.o. male who presents today with the following Chief Complaint(s):    Chief Complaint   Patient presents with    Follow-up    Diabetes         HPIelevated PSA. Needs 350. Says he does not have it. Still working on it. No LUTS. Current Outpatient Medications   Medication Sig Dispense Refill    losartan (COZAAR) 50 MG tablet TAKE 1 TABLET BY MOUTH EVERY DAY 90 tablet 1    atorvastatin (LIPITOR) 40 MG tablet TAKE 1 TABLET BY MOUTH EVERY DAY 90 tablet 3    methocarbamol (ROBAXIN) 500 MG tablet TAKE 1 TABLET BY MOUTH 2 TIMES DAILY AS NEEDED (MUSCLE SPASM) 60 tablet 1    cilostazol (PLETAL) 100 MG tablet TAKE 1 TABLET BY MOUTH 2 TIMES DAILY 180 tablet 1    amLODIPine (NORVASC) 5 MG tablet TAKE 1 TABLET BY MOUTH EVERY DAY 90 tablet 3    allopurinol (ZYLOPRIM) 300 MG tablet TAKE 1 TABLET BY MOUTH DAILY FOR GOUT 90 tablet 3    colchicine (MITIGARE) 0.6 MG capsule Lot [id number] Exp 5/2020 28 capsule 0    gabapentin (NEURONTIN) 100 MG capsule Take 1 capsule by mouth 3 times daily as needed (nerve pain.) for up to 30 days. 90 capsule 1    metFORMIN (GLUCOPHAGE) 500 MG tablet Take 1 tablet by mouth daily (with breakfast) for diabetes. 90 tablet 3    aspirin 81 MG tablet Take 1 tablet by mouth daily 30 tablet 11     No current facility-administered medications for this visit. Patient's past medical history, surgical history, family history, medications,and allergies  were all reviewed and updated as appropriate today. Review of Systems      Physical Exam    Vitals:    09/07/22 0939   BP: 118/72   Pulse: 76   SpO2: 99%       Assessment:  Encounter Diagnosis   Name Primary? Type 2 diabetes with complication (HCC) Yes       Plan:  1.  Type 2 diabetes with complication (HCC)  ***  - POCT Glucose  - POCT glycosylated hemoglobin (Hb A1C)

## 2022-09-26 ASSESSMENT — ENCOUNTER SYMPTOMS
GASTROINTESTINAL NEGATIVE: 1
EYES NEGATIVE: 1

## 2022-09-26 NOTE — PROGRESS NOTES
Patient: Ny Mireles is a 57 y.o. male who presents today with the following Chief Complaint(s):    Chief Complaint   Patient presents with    Follow-up    Diabetes         He is here for a check up and F/U on hypertension, hyperlipidemia, diabetes, type 2. He is taking medication as prescribed, is aware of the importance of healthy eating and regular physical activity. H/O elevated PSA. No LUTS. Saw urology, biopsy planned but needed 300 dollars upfront which he did not have. He does continue to smoke cigarettes despite repeated counseling. Has not completed CT lung screen as of yet. Referral to vascular surgeon. H/O PAD. Calves burn after about 1/2 blk ambulation. Current Outpatient Medications   Medication Sig Dispense Refill    losartan (COZAAR) 50 MG tablet TAKE 1 TABLET BY MOUTH EVERY DAY 90 tablet 1    atorvastatin (LIPITOR) 40 MG tablet TAKE 1 TABLET BY MOUTH EVERY DAY 90 tablet 3    methocarbamol (ROBAXIN) 500 MG tablet TAKE 1 TABLET BY MOUTH 2 TIMES DAILY AS NEEDED (MUSCLE SPASM) 60 tablet 1    cilostazol (PLETAL) 100 MG tablet TAKE 1 TABLET BY MOUTH 2 TIMES DAILY 180 tablet 1    amLODIPine (NORVASC) 5 MG tablet TAKE 1 TABLET BY MOUTH EVERY DAY 90 tablet 3    allopurinol (ZYLOPRIM) 300 MG tablet TAKE 1 TABLET BY MOUTH DAILY FOR GOUT 90 tablet 3    colchicine (MITIGARE) 0.6 MG capsule Lot [id number] Exp 5/2020 28 capsule 0    gabapentin (NEURONTIN) 100 MG capsule Take 1 capsule by mouth 3 times daily as needed (nerve pain.) for up to 30 days. 90 capsule 1    metFORMIN (GLUCOPHAGE) 500 MG tablet Take 1 tablet by mouth daily (with breakfast) for diabetes. 90 tablet 3    aspirin 81 MG tablet Take 1 tablet by mouth daily 30 tablet 11     No current facility-administered medications for this visit. Patient's past medical history, surgical history, family history, medications,and allergies  were all reviewed and updated as appropriate today.       Review of Systems Constitutional: Negative. HENT: Negative. Eyes: Negative. Cardiovascular:  Negative for chest pain. Hypertension, treated with ARB, Ca blker. PAD, see HPI. Gastrointestinal: Negative. Endocrine:        T2 DM, treated with metformin. A1c 5.6. Hyperlipidemia, treated with statin, LDL previously 97. Current pending. Genitourinary:         Elevated PSA. See HPI. PSA 7.97. see HPI. Musculoskeletal: Negative. Skin: Negative. Neurological: Negative. Psychiatric/Behavioral: Negative. Physical Exam  Constitutional:       General: He is not in acute distress. Appearance: He is well-developed. HENT:      Head: Normocephalic and atraumatic. Right Ear: External ear normal.      Left Ear: External ear normal.      Nose: Nose normal.   Eyes:      General: No scleral icterus. Conjunctiva/sclera: Conjunctivae normal.      Pupils: Pupils are equal, round, and reactive to light. Neck:      Thyroid: No thyromegaly. Cardiovascular:      Rate and Rhythm: Normal rate and regular rhythm. Heart sounds: Normal heart sounds. Comments: PT/DP pulses not appreciated. Extremities warm. Capillary refill sluggish. Pulmonary:      Effort: Pulmonary effort is normal.      Breath sounds: Normal breath sounds. Abdominal:      General: Bowel sounds are normal.      Palpations: Abdomen is soft. There is no mass. Musculoskeletal:      Cervical back: Normal range of motion and neck supple. Lymphadenopathy:      Cervical: No cervical adenopathy. Skin:     General: Skin is warm and dry. Neurological:      Mental Status: He is alert and oriented to person, place, and time. Deep Tendon Reflexes: Reflexes are normal and symmetric. Psychiatric:         Behavior: Behavior normal.         Thought Content:  Thought content normal.         Judgment: Judgment normal.       Vitals:    09/07/22 0939   BP: 118/72   Pulse: 76   SpO2: 99%       Assessment:   Diagnosis Orders   1. Type 2 diabetes mellitus with complication, without long-term current use of insulin (HCC)  Diet, physical activity and weight reduction discussed. POCT Glucose    POCT glycosylated hemoglobin (Hb A1C)    metFORMIN (GLUCOPHAGE) 500 MG tablet   2. Essential hypertension  amLODIPine (NORVASC) 5 MG tablet  DASH and low sodium diet discussed. 3. Elevated PSA  JOEY - Billy Matos MD, Urology, [facility]  Advised to proceed with biopsy. 4. Mixed hyperlipidemia  Heart healthy diet and statin compliance discussed. 5.      PAD: no improvement with pletal. Discontinue. Vascular surgery consultation. 6.      Cigarette nicotine dependence: Continue to work on complete smoking cessation. Read the literature, take medication if prescribed, contact me if there are further questions. CT lung screen discussed. Plan:  See plans above.

## 2022-09-28 DIAGNOSIS — G62.9 NEUROPATHY: ICD-10-CM

## 2022-09-29 RX ORDER — GABAPENTIN 100 MG/1
100 CAPSULE ORAL 3 TIMES DAILY PRN
Qty: 90 CAPSULE | Refills: 1 | Status: SHIPPED | OUTPATIENT
Start: 2022-09-29 | End: 2022-10-29

## 2022-11-02 DIAGNOSIS — Z87.39 HISTORY OF GOUT: ICD-10-CM

## 2022-11-02 RX ORDER — ALLOPURINOL 300 MG/1
300 TABLET ORAL DAILY
Qty: 90 TABLET | Refills: 3 | Status: SHIPPED | OUTPATIENT
Start: 2022-11-02

## 2022-11-02 NOTE — TELEPHONE ENCOUNTER
Medication:   Requested Prescriptions     Pending Prescriptions Disp Refills    allopurinol (ZYLOPRIM) 300 MG tablet [Pharmacy Med Name: ALLOPURINOL 300 MG TABLET] 90 tablet 3     Sig: TAKE 1 TABLET BY MOUTH DAILY FOR GOUT        Last Filled: 10/15/21    Patient Phone Number: [phone] (home)     Last appt: 9/7/2022   Next appt: 12/14/2022

## 2022-11-29 ENCOUNTER — OFFICE VISIT (OUTPATIENT)
Dept: INTERNAL MEDICINE CLINIC | Age: 62
End: 2022-11-29
Payer: MEDICARE

## 2022-11-29 VITALS
HEART RATE: 67 BPM | SYSTOLIC BLOOD PRESSURE: 136 MMHG | OXYGEN SATURATION: 99 % | DIASTOLIC BLOOD PRESSURE: 76 MMHG | BODY MASS INDEX: 27.37 KG/M2 | WEIGHT: 174.4 LBS | HEIGHT: 67 IN

## 2022-11-29 DIAGNOSIS — K92.1 BLOOD IN STOOL: ICD-10-CM

## 2022-11-29 DIAGNOSIS — H61.23 BILATERAL IMPACTED CERUMEN: ICD-10-CM

## 2022-11-29 DIAGNOSIS — R19.7 DIARRHEA, UNSPECIFIED TYPE: ICD-10-CM

## 2022-11-29 DIAGNOSIS — R05.3 CHRONIC COUGH: ICD-10-CM

## 2022-11-29 DIAGNOSIS — I10 PRIMARY HYPERTENSION: ICD-10-CM

## 2022-11-29 DIAGNOSIS — I10 PRIMARY HYPERTENSION: Primary | ICD-10-CM

## 2022-11-29 PROCEDURE — 3017F COLORECTAL CA SCREEN DOC REV: CPT | Performed by: INTERNAL MEDICINE

## 2022-11-29 PROCEDURE — G8484 FLU IMMUNIZE NO ADMIN: HCPCS | Performed by: INTERNAL MEDICINE

## 2022-11-29 PROCEDURE — 3074F SYST BP LT 130 MM HG: CPT | Performed by: INTERNAL MEDICINE

## 2022-11-29 PROCEDURE — 3078F DIAST BP <80 MM HG: CPT | Performed by: INTERNAL MEDICINE

## 2022-11-29 PROCEDURE — 99214 OFFICE O/P EST MOD 30 MIN: CPT | Performed by: INTERNAL MEDICINE

## 2022-11-29 PROCEDURE — G8417 CALC BMI ABV UP PARAM F/U: HCPCS | Performed by: INTERNAL MEDICINE

## 2022-11-29 PROCEDURE — G8427 DOCREV CUR MEDS BY ELIG CLIN: HCPCS | Performed by: INTERNAL MEDICINE

## 2022-11-29 PROCEDURE — 4004F PT TOBACCO SCREEN RCVD TLK: CPT | Performed by: INTERNAL MEDICINE

## 2022-11-29 NOTE — PROGRESS NOTES
[facility]  Patient: Suzanne Gutierrez is a 57 y.o. male who presents today with the following Chief Complaint(s):    Chief Complaint   Patient presents with    Diarrhea    Weight Loss         HPIHe complains of diarrhea, cannot hold anything on his stomach. BM all liquid. Saw blood in stool this morning. Emmalene Fill it had  'cold in it'. Denies fever or chills. Frustrated having to go to different places to have different things done. I told him that we have to send him to specialist for the particular area of concern. Current Outpatient Medications   Medication Sig Dispense Refill    allopurinol (ZYLOPRIM) 300 MG tablet TAKE 1 TABLET BY MOUTH DAILY FOR GOUT 90 tablet 3    losartan (COZAAR) 50 MG tablet TAKE 1 TABLET BY MOUTH EVERY DAY 90 tablet 1    atorvastatin (LIPITOR) 40 MG tablet TAKE 1 TABLET BY MOUTH EVERY DAY 90 tablet 3    methocarbamol (ROBAXIN) 500 MG tablet TAKE 1 TABLET BY MOUTH 2 TIMES DAILY AS NEEDED (MUSCLE SPASM) 60 tablet 1    cilostazol (PLETAL) 100 MG tablet TAKE 1 TABLET BY MOUTH 2 TIMES DAILY 180 tablet 1    amLODIPine (NORVASC) 5 MG tablet TAKE 1 TABLET BY MOUTH EVERY DAY 90 tablet 3    colchicine (MITIGARE) 0.6 MG capsule Lot [id number] Exp 5/2020 28 capsule 0    aspirin 81 MG tablet Take 1 tablet by mouth daily 30 tablet 11    gabapentin (NEURONTIN) 100 MG capsule TAKE 1 CAPSULE BY MOUTH 3 TIMES DAILY AS NEEDED (NERVE PAIN.) FOR UP TO 30 DAYS. 90 capsule 1    metFORMIN (GLUCOPHAGE) 500 MG tablet Take 1 tablet by mouth daily (with breakfast) for diabetes. 90 tablet 3     No current facility-administered medications for this visit. Patient's past medical history, surgical history, family history, medications,and allergies  were all reviewed and updated as appropriate today. Review of Systems   Constitutional:         4 pound weight loss since September. HENT: Negative. Eyes: Negative.     Respiratory:  Positive for Spoke with the patient's mother to convey the results of the patient's influenza and strep tests.  Notified mother that the patient was positive for strep and an antibiotic was sent to the pharmacy to treat that.  Notified mother that the patient was also positive for influenza A and tamiflu was sent to the pharmacy.  Advised mother to complete both prescriptions as ordered, increase fluid intake, get lots of rest, and follow up with PCP as needed.  Mother verbalized understanding with no further questions or concerns at this time.    cough.         Not productive cough. Continues to smoke cigarettes despite counseling. Cardiovascular:         Hypertension, treated with Ca blker and ARB. Gastrointestinal:  Positive for blood in stool and diarrhea. Negative for abdominal pain and rectal pain. See HPI. Genitourinary: Negative. Musculoskeletal: Negative. Skin: Negative. Neurological: Negative. Psychiatric/Behavioral: Negative. Physical Exam  Constitutional:       General: He is not in acute distress. Appearance: He is well-developed. HENT:      Head: Normocephalic and atraumatic. Right Ear: External ear normal.      Left Ear: External ear normal.      Nose: Nose normal.   Eyes:      General: No scleral icterus. Conjunctiva/sclera: Conjunctivae normal.      Pupils: Pupils are equal, round, and reactive to light. Neck:      Thyroid: No thyromegaly. Cardiovascular:      Rate and Rhythm: Normal rate and regular rhythm. Heart sounds: Normal heart sounds. Pulmonary:      Effort: Pulmonary effort is normal.      Comments: Mild expiratory wheeze. Abdominal:      General: Bowel sounds are normal. There is no distension. Palpations: Abdomen is soft. There is no mass. Tenderness: There is no abdominal tenderness. There is no guarding or rebound. Musculoskeletal:      Cervical back: Normal range of motion and neck supple. Lymphadenopathy:      Cervical: No cervical adenopathy. Skin:     General: Skin is warm and dry. Neurological:      Mental Status: He is alert and oriented to person, place, and time. Deep Tendon Reflexes: Reflexes are normal and symmetric. Psychiatric:         Behavior: Behavior normal.         Thought Content: Thought content normal.         Judgment: Judgment normal.       Vitals:    11/29/22 0933   BP: (!) 140/72   Pulse:    SpO2:        Assessment:   Diagnosis Orders   1. Primary hypertension  Continue ARB and Ca blker.    DASH and low sodium diet discussed. 2. Blood in stool  JOEY Lima MD, Gastroenterology, [facility]    CBC               3. Chronic cough with associated wheezing. Offered MDI, rescue. He was not interested. XR CHEST STANDARD (2 VW)      4. Diarrhea, unspecified type  Stool w/u. C. Difficile toxin. Stool for fecal leukocytes. GI f/u. Plan:  See plans above.

## 2022-11-29 NOTE — PATIENT INSTRUCTIONS
[facility] Laboratory Locations - No appointment necessary. @ indicates the location is open Saturdays in addition to Monday through Friday. Call your preferred location for test preparation, business hours and other information you need. [employer] accepts [employer]. [facility]    @ [place] [facility] Svcs. 3 [facility] [address] [place] [address]. [address]   Ph: [phone] [facility] Lab Svcs. [address]   Ph: [phone]  @ [facility] Lab Svcs. 3[address] [facility]   Ph: [phone]    [facility] Svcs. [address]   Ph: [phone] @ [place] Lab Svcs. [address]  Ph: [phone] @ [facility] S[facility]. 835 [facility]. Edu[address] Hol[address] [facility] [address]   Ph: [phone]     Melinda Barros [facility]. [place] [address]  Ph: [phone]    [place]   @ [place] Lab Svcs. [address] [place], [place] [address]   Ph: [phone] [facility] Bld[facility] [address] [facility], [address] [place] [address]  Ph: [phone] Lab Svcs[address] 54 [facility]   Ph: [phone] [facility]. Lab Svcs.   211 [facility], [facility]   Ph: [phone]

## 2022-11-30 DIAGNOSIS — I10 ESSENTIAL HYPERTENSION: ICD-10-CM

## 2022-11-30 LAB
A/G RATIO: 1.4 (ref 1.1–2.2)
ALBUMIN SERPL-MCNC: 4.1 G/DL (ref 3.4–5)
ALP BLD-CCNC: 125 U/L (ref 40–129)
ALT SERPL-CCNC: 16 U/L (ref 10–40)
ANION GAP SERPL CALCULATED.3IONS-SCNC: 14 MMOL/L (ref 3–16)
AST SERPL-CCNC: 17 U/L (ref 15–37)
BASOPHILS ABSOLUTE: 0 K/UL (ref 0–0.2)
BASOPHILS RELATIVE PERCENT: 0.3 %
BILIRUB SERPL-MCNC: 0.3 MG/DL (ref 0–1)
BUN BLDV-MCNC: 6 MG/DL (ref 7–20)
CALCIUM SERPL-MCNC: 9.6 MG/DL (ref 8.3–10.6)
CHLORIDE BLD-SCNC: 104 MMOL/L (ref 99–110)
CHOLESTEROL, TOTAL: 132 MG/DL (ref 0–199)
CO2: 25 MMOL/L (ref 21–32)
CREAT SERPL-MCNC: 0.8 MG/DL (ref 0.8–1.3)
CREATININE URINE: 258.4 MG/DL (ref 39–259)
EOSINOPHILS ABSOLUTE: 0.2 K/UL (ref 0–0.6)
EOSINOPHILS RELATIVE PERCENT: 2.5 %
GFR SERPL CREATININE-BSD FRML MDRD: >60 ML/MIN/{1.73_M2}
GLUCOSE BLD-MCNC: 112 MG/DL (ref 70–99)
HCT VFR BLD CALC: 36.2 % (ref 40.5–52.5)
HDLC SERPL-MCNC: 29 MG/DL (ref 40–60)
HEMOGLOBIN: 11.5 G/DL (ref 13.5–17.5)
LDL CHOLESTEROL CALCULATED: 79 MG/DL
LYMPHOCYTES ABSOLUTE: 3.6 K/UL (ref 1–5.1)
LYMPHOCYTES RELATIVE PERCENT: 45.5 %
MCH RBC QN AUTO: 27 PG (ref 26–34)
MCHC RBC AUTO-ENTMCNC: 31.8 G/DL (ref 31–36)
MCV RBC AUTO: 84.8 FL (ref 80–100)
MICROALBUMIN UR-MCNC: <1.2 MG/DL
MICROALBUMIN/CREAT UR-RTO: NORMAL MG/G (ref 0–30)
MONOCYTES ABSOLUTE: 1 K/UL (ref 0–1.3)
MONOCYTES RELATIVE PERCENT: 12.2 %
NEUTROPHILS ABSOLUTE: 3.1 K/UL (ref 1.7–7.7)
NEUTROPHILS RELATIVE PERCENT: 39.5 %
PDW BLD-RTO: 17.1 % (ref 12.4–15.4)
PLATELET # BLD: 396 K/UL (ref 135–450)
PMV BLD AUTO: 7.7 FL (ref 5–10.5)
POTASSIUM SERPL-SCNC: 3.9 MMOL/L (ref 3.5–5.1)
RBC # BLD: 4.26 M/UL (ref 4.2–5.9)
SODIUM BLD-SCNC: 143 MMOL/L (ref 136–145)
TOTAL PROTEIN: 7 G/DL (ref 6.4–8.2)
TRIGL SERPL-MCNC: 119 MG/DL (ref 0–150)
TSH REFLEX: 0.43 UIU/ML (ref 0.27–4.2)
VLDLC SERPL CALC-MCNC: 24 MG/DL
WBC # BLD: 7.8 K/UL (ref 4–11)

## 2022-11-30 RX ORDER — AMLODIPINE BESYLATE 5 MG/1
TABLET ORAL
Qty: 90 TABLET | Refills: 3 | Status: SHIPPED | OUTPATIENT
Start: 2022-11-30

## 2022-11-30 RX ORDER — LOSARTAN POTASSIUM 50 MG/1
TABLET ORAL
Qty: 90 TABLET | Refills: 3 | Status: SHIPPED | OUTPATIENT
Start: 2022-11-30

## 2022-11-30 NOTE — TELEPHONE ENCOUNTER
Medication:   Requested Prescriptions     Pending Prescriptions Disp Refills    losartan (COZAAR) 50 MG tablet [Pharmacy Med Name: LOSARTAN POTASSIUM 50 MG TAB] 90 tablet 1     Sig: TAKE 1 TABLET BY MOUTH EVERY DAY    amLODIPine (NORVASC) 5 MG tablet [Pharmacy Med Name: AMLODIPINE BESYLATE 5 MG TAB] 90 tablet 3     Sig: TAKE 1 TABLET BY MOUTH EVERY DAY        Last Filled:    Losartan 08/22/22  Amlodipine 12/14/21    Patient Phone Number: [phone] (home)     Last appt: 11/29/2022   Next appt: 12/14/2022

## 2022-12-01 DIAGNOSIS — E11.8 TYPE 2 DIABETES MELLITUS WITH COMPLICATION, WITHOUT LONG-TERM CURRENT USE OF INSULIN (HCC): ICD-10-CM

## 2022-12-01 NOTE — TELEPHONE ENCOUNTER
Medication:   Requested Prescriptions     Pending Prescriptions Disp Refills    metFORMIN (GLUCOPHAGE) 500 MG tablet [Pharmacy Med Name: METFORMIN  MG TABLET] 90 tablet 3     Sig: TAKE 1 TABLET BY MOUTH DAILY (WITH BREAKFAST) FOR DIABETES.         Last Filled: 12/14/21    Patient Phone Number: [phone] (home)     Last appt: 11/29/2022   Next appt: 12/14/2022

## 2022-12-12 ASSESSMENT — ENCOUNTER SYMPTOMS
BLOOD IN STOOL: 1
RECTAL PAIN: 0
ABDOMINAL PAIN: 0
DIARRHEA: 1
EYES NEGATIVE: 1
COUGH: 1

## 2022-12-14 ENCOUNTER — OFFICE VISIT (OUTPATIENT)
Dept: INTERNAL MEDICINE CLINIC | Age: 62
End: 2022-12-14
Payer: MEDICARE

## 2022-12-14 VITALS
OXYGEN SATURATION: 97 % | DIASTOLIC BLOOD PRESSURE: 80 MMHG | SYSTOLIC BLOOD PRESSURE: 128 MMHG | HEART RATE: 87 BPM | WEIGHT: 175 LBS | HEIGHT: 67 IN | BODY MASS INDEX: 27.47 KG/M2

## 2022-12-14 DIAGNOSIS — D64.9 CHRONIC ANEMIA: Primary | ICD-10-CM

## 2022-12-14 DIAGNOSIS — R91.8 LUNG FIELD ABNORMAL FINDING ON EXAMINATION: ICD-10-CM

## 2022-12-14 DIAGNOSIS — Z86.010 HISTORY OF COLON POLYPS: ICD-10-CM

## 2022-12-14 DIAGNOSIS — F17.218 CIGARETTE NICOTINE DEPENDENCE WITH OTHER NICOTINE-INDUCED DISORDER: ICD-10-CM

## 2022-12-14 PROCEDURE — 99214 OFFICE O/P EST MOD 30 MIN: CPT | Performed by: INTERNAL MEDICINE

## 2022-12-14 PROCEDURE — 3017F COLORECTAL CA SCREEN DOC REV: CPT | Performed by: INTERNAL MEDICINE

## 2022-12-14 PROCEDURE — G8482 FLU IMMUNIZE ORDER/ADMIN: HCPCS | Performed by: INTERNAL MEDICINE

## 2022-12-14 PROCEDURE — G8417 CALC BMI ABV UP PARAM F/U: HCPCS | Performed by: INTERNAL MEDICINE

## 2022-12-14 PROCEDURE — G8427 DOCREV CUR MEDS BY ELIG CLIN: HCPCS | Performed by: INTERNAL MEDICINE

## 2022-12-14 PROCEDURE — 3078F DIAST BP <80 MM HG: CPT | Performed by: INTERNAL MEDICINE

## 2022-12-14 PROCEDURE — 4004F PT TOBACCO SCREEN RCVD TLK: CPT | Performed by: INTERNAL MEDICINE

## 2022-12-14 PROCEDURE — 3074F SYST BP LT 130 MM HG: CPT | Performed by: INTERNAL MEDICINE

## 2022-12-14 NOTE — PROGRESS NOTES
Patient: Jayden Perdue is a 57 y.o. male who presents today with the following Chief Complaint(s):    Chief Complaint   Patient presents with    Follow-up         HPIRecent about of diarrhea. Resolved. Thinks he had a virus. Labs demonstrated decrease H/H. H/O 10 sigmoid polyps, snared, and internal hemorrhoids. 1 year f/u suggested. He is due. Feeling much better. Wheezing resolved. CXR pending. Continues to smoke cigarettes despite repeated counseling. Current Outpatient Medications   Medication Sig Dispense Refill    metFORMIN (GLUCOPHAGE) 500 MG tablet TAKE 1 TABLET BY MOUTH DAILY (WITH BREAKFAST) FOR DIABETES. 90 tablet 3    losartan (COZAAR) 50 MG tablet TAKE 1 TABLET BY MOUTH EVERY DAY 90 tablet 3    amLODIPine (NORVASC) 5 MG tablet TAKE 1 TABLET BY MOUTH EVERY DAY 90 tablet 3    allopurinol (ZYLOPRIM) 300 MG tablet TAKE 1 TABLET BY MOUTH DAILY FOR GOUT 90 tablet 3    atorvastatin (LIPITOR) 40 MG tablet TAKE 1 TABLET BY MOUTH EVERY DAY 90 tablet 3    methocarbamol (ROBAXIN) 500 MG tablet TAKE 1 TABLET BY MOUTH 2 TIMES DAILY AS NEEDED (MUSCLE SPASM) 60 tablet 1    cilostazol (PLETAL) 100 MG tablet TAKE 1 TABLET BY MOUTH 2 TIMES DAILY 180 tablet 1    colchicine (MITIGARE) 0.6 MG capsule Lot [id number] Exp 5/2020 28 capsule 0    gabapentin (NEURONTIN) 100 MG capsule TAKE 1 CAPSULE BY MOUTH 3 TIMES DAILY AS NEEDED (NERVE PAIN.) FOR UP TO 30 DAYS. 90 capsule 1    aspirin 81 MG tablet Take 1 tablet by mouth daily 30 tablet 11     No current facility-administered medications for this visit. Patient's past medical history, surgical history, family history, medications,and allergies  were all reviewed and updated as appropriate today. Review of Systems   Constitutional: Negative. HENT: Negative. Eyes: Negative. Respiratory: Negative. Cardiovascular: Negative. Gastrointestinal:         See HPI. Colonic polyps and hemorrhoid history. Genitourinary: Negative.     Musculoskeletal: Negative. Skin: Negative. Neurological: Negative. Hematological:         Anemia, iron deficiency. Psychiatric/Behavioral: Negative. Physical Exam  Constitutional:       General: He is not in acute distress. Appearance: He is well-developed. HENT:      Head: Normocephalic and atraumatic. Right Ear: External ear normal.      Left Ear: External ear normal.      Nose: Nose normal.   Eyes:      General: No scleral icterus. Conjunctiva/sclera: Conjunctivae normal.      Pupils: Pupils are equal, round, and reactive to light. Neck:      Thyroid: No thyromegaly. Cardiovascular:      Rate and Rhythm: Normal rate and regular rhythm. Heart sounds: Normal heart sounds. Pulmonary:      Effort: Pulmonary effort is normal.      Breath sounds: Normal breath sounds. Abdominal:      General: Bowel sounds are normal.      Palpations: Abdomen is soft. There is no mass. Musculoskeletal:      Cervical back: Normal range of motion and neck supple. Lymphadenopathy:      Cervical: No cervical adenopathy. Skin:     General: Skin is warm and dry. Neurological:      Mental Status: He is alert and oriented to person, place, and time. Deep Tendon Reflexes: Reflexes are normal and symmetric. Psychiatric:         Behavior: Behavior normal.         Thought Content: Thought content normal.         Judgment: Judgment normal.       Vitals:    12/14/22 0907   BP: 128/80   Pulse: 87   SpO2: 97%       Assessment:   Diagnosis Orders   1. Chronic anemia  Ferritin    Iron and TIBC    Reticulocytes  Discussed possible causes/      2. History of colon polyps  JOEY - Pardeep Black MD, Gastroenterology (ERCP & EUS), [place]  Due for f/u.       3. Lung field abnormal finding on examination  CXR. 4. Cigarette nicotine dependence with other nicotine-induced disorder  Continue to work on complete smoking cessation.   Read the literature, take medication if prescribed, contact me if there are further questions. Plan:  See plans above.

## 2022-12-15 ENCOUNTER — HOSPITAL ENCOUNTER (OUTPATIENT)
Dept: GENERAL RADIOLOGY | Age: 62
Discharge: HOME | End: 2022-12-15
Payer: MEDICARE

## 2022-12-15 ENCOUNTER — HOSPITAL ENCOUNTER (OUTPATIENT)
Age: 62
Discharge: HOME | End: 2022-12-15
Payer: MEDICARE

## 2022-12-15 DIAGNOSIS — R05.3 CHRONIC COUGH: ICD-10-CM

## 2022-12-15 DIAGNOSIS — D64.9 CHRONIC ANEMIA: ICD-10-CM

## 2022-12-15 LAB
FERRITIN: 14.3 NG/ML (ref 30–400)
HCT VFR BLD CALC: 34.9 % (ref 40.5–52.5)
IMMATURE RETIC FRACT: 0.48 (ref 0.21–0.37)
IRON SATURATION: 9 % (ref 20–50)
IRON: 33 UG/DL (ref 59–158)
RETICULOCYTE ABSOLUTE COUNT: 0.05 M/UL
RETICULOCYTE COUNT PCT: 1.29 % (ref 0.5–2.18)
TOTAL IRON BINDING CAPACITY: 366 UG/DL (ref 260–445)

## 2022-12-15 PROCEDURE — 71046 X-RAY EXAM CHEST 2 VIEWS: CPT

## 2022-12-26 ASSESSMENT — ENCOUNTER SYMPTOMS
RESPIRATORY NEGATIVE: 1
EYES NEGATIVE: 1

## 2023-02-16 ENCOUNTER — OFFICE VISIT (OUTPATIENT)
Dept: INTERNAL MEDICINE CLINIC | Age: 63
End: 2023-02-16

## 2023-02-16 VITALS
HEART RATE: 64 BPM | DIASTOLIC BLOOD PRESSURE: 82 MMHG | BODY MASS INDEX: 28.25 KG/M2 | HEIGHT: 67 IN | SYSTOLIC BLOOD PRESSURE: 126 MMHG | OXYGEN SATURATION: 98 % | WEIGHT: 180 LBS

## 2023-02-16 DIAGNOSIS — F17.218 CIGARETTE NICOTINE DEPENDENCE WITH OTHER NICOTINE-INDUCED DISORDER: ICD-10-CM

## 2023-02-16 DIAGNOSIS — I10 PRIMARY HYPERTENSION: ICD-10-CM

## 2023-02-16 DIAGNOSIS — I73.9 PAD (PERIPHERAL ARTERY DISEASE) (HCC): ICD-10-CM

## 2023-02-16 DIAGNOSIS — E78.2 MIXED HYPERLIPIDEMIA: ICD-10-CM

## 2023-02-16 DIAGNOSIS — E11.8 TYPE 2 DIABETES MELLITUS WITH COMPLICATION, WITHOUT LONG-TERM CURRENT USE OF INSULIN (HCC): Primary | ICD-10-CM

## 2023-02-16 DIAGNOSIS — R97.20 ELEVATED PSA: ICD-10-CM

## 2023-02-16 LAB
CHP ED QC CHECK: NORMAL
GLUCOSE BLD-MCNC: 95 MG/DL
HBA1C MFR BLD: 5.8 %

## 2023-02-16 SDOH — ECONOMIC STABILITY: INCOME INSECURITY: HOW HARD IS IT FOR YOU TO PAY FOR THE VERY BASICS LIKE FOOD, HOUSING, MEDICAL CARE, AND HEATING?: NOT HARD AT ALL

## 2023-02-16 SDOH — ECONOMIC STABILITY: FOOD INSECURITY: WITHIN THE PAST 12 MONTHS, YOU WORRIED THAT YOUR FOOD WOULD RUN OUT BEFORE YOU GOT MONEY TO BUY MORE.: NEVER TRUE

## 2023-02-16 SDOH — ECONOMIC STABILITY: FOOD INSECURITY: WITHIN THE PAST 12 MONTHS, THE FOOD YOU BOUGHT JUST DIDN'T LAST AND YOU DIDN'T HAVE MONEY TO GET MORE.: NEVER TRUE

## 2023-02-16 SDOH — ECONOMIC STABILITY: HOUSING INSECURITY
IN THE LAST 12 MONTHS, WAS THERE A TIME WHEN YOU DID NOT HAVE A STEADY PLACE TO SLEEP OR SLEPT IN A SHELTER (INCLUDING NOW)?: NO

## 2023-02-16 ASSESSMENT — PATIENT HEALTH QUESTIONNAIRE - PHQ9
SUM OF ALL RESPONSES TO PHQ QUESTIONS 1-9: 0
SUM OF ALL RESPONSES TO PHQ QUESTIONS 1-9: 0
SUM OF ALL RESPONSES TO PHQ9 QUESTIONS 1 & 2: 0
1. LITTLE INTEREST OR PLEASURE IN DOING THINGS: 0
SUM OF ALL RESPONSES TO PHQ QUESTIONS 1-9: 0
2. FEELING DOWN, DEPRESSED OR HOPELESS: 0
SUM OF ALL RESPONSES TO PHQ QUESTIONS 1-9: 0

## 2023-02-16 NOTE — PROGRESS NOTES
Patient: Rukhsana Cat is a 57 y.o. male who presents today with the following Chief Complaint(s):    Chief Complaint   Patient presents with    Follow-up    Hypertension    Diabetes         HPIstill working on getting colonoscopy. Does not have someone who can go with him. Soupy like stool sees blood. Twice weekly at times. Still smokes. Pack last 3 days. Not walking. Some sob with walking and legs burn calf. Current Outpatient Medications   Medication Sig Dispense Refill    metFORMIN (GLUCOPHAGE) 500 MG tablet TAKE 1 TABLET BY MOUTH DAILY (WITH BREAKFAST) FOR DIABETES. 90 tablet 3    losartan (COZAAR) 50 MG tablet TAKE 1 TABLET BY MOUTH EVERY DAY 90 tablet 3    amLODIPine (NORVASC) 5 MG tablet TAKE 1 TABLET BY MOUTH EVERY DAY 90 tablet 3    allopurinol (ZYLOPRIM) 300 MG tablet TAKE 1 TABLET BY MOUTH DAILY FOR GOUT 90 tablet 3    atorvastatin (LIPITOR) 40 MG tablet TAKE 1 TABLET BY MOUTH EVERY DAY 90 tablet 3    methocarbamol (ROBAXIN) 500 MG tablet TAKE 1 TABLET BY MOUTH 2 TIMES DAILY AS NEEDED (MUSCLE SPASM) 60 tablet 1    cilostazol (PLETAL) 100 MG tablet TAKE 1 TABLET BY MOUTH 2 TIMES DAILY 180 tablet 1    colchicine (MITIGARE) 0.6 MG capsule Lot [id number] Exp 5/2020 28 capsule 0    gabapentin (NEURONTIN) 100 MG capsule TAKE 1 CAPSULE BY MOUTH 3 TIMES DAILY AS NEEDED (NERVE PAIN.) FOR UP TO 30 DAYS. 90 capsule 1    aspirin 81 MG tablet Take 1 tablet by mouth daily 30 tablet 11     No current facility-administered medications for this visit. Patient's past medical history, surgical history, family history, medications,and allergies  were all reviewed and updated as appropriate today. Review of Systems      Physical Exam    Vitals:    02/16/23 1214   BP: 126/82   Pulse: 64   SpO2: 98%       Assessment:  Encounter Diagnosis   Name Primary? Type 2 diabetes mellitus with complication, without long-term current use of insulin ([facility] Yes       Plan:  1.  Type 2 diabetes mellitus with complication, without long-term current use of insulin (HCC)  ***  - POCT Glucose  - POCT glycosylated hemoglobin (Hb A1C)

## 2023-02-16 NOTE — PATIENT INSTRUCTIONS
[facility] Laboratory Locations - No appointment necessary. @ indicates the location is open Saturdays in addition to Monday through Friday. Call your preferred location for test preparation, business hours and other information you need. [employer] accepts [employer]. [facility]     @ [address] [facility] [address] [facility]. [place], [address]    Ph: [address]   650 [facility] [address], [address]    Ph: [phone]   @ [address] [place] [address].[facility]    Ph: [address]    Ph: [phone]  @ [address] [place] [address]   Ph: [phone]  @ [address] [place] [address]. Edu[address] Hol[address] [facility] [address]    Ph: [address] [place] [address] 19   Ph: [phone]    [place]    @ [facility]. [place], [place] [address]    Ph: [phone]  [place] [address] [facility], [address]   Ph: [address]. [place], [address] [place] [address] 30: 311 [facility] Jake Christiansen    Ph: [phone]   [facility]   [address] 2026 [facility]. [address]   Ph: [address] [facility]  [address] [place], [place] [address]    Ph: [phone]

## 2023-02-28 DIAGNOSIS — E11.8 TYPE 2 DIABETES MELLITUS WITH COMPLICATION, WITHOUT LONG-TERM CURRENT USE OF INSULIN (HCC): ICD-10-CM

## 2023-02-28 DIAGNOSIS — G62.9 NEUROPATHY: ICD-10-CM

## 2023-02-28 DIAGNOSIS — E78.5 HYPERLIPIDEMIA, UNSPECIFIED HYPERLIPIDEMIA TYPE: ICD-10-CM

## 2023-02-28 DIAGNOSIS — M62.838 MUSCLE SPASM: ICD-10-CM

## 2023-02-28 DIAGNOSIS — I73.9 PAD (PERIPHERAL ARTERY DISEASE) (HCC): ICD-10-CM

## 2023-02-28 DIAGNOSIS — I10 ESSENTIAL HYPERTENSION: ICD-10-CM

## 2023-02-28 RX ORDER — CILOSTAZOL 100 MG/1
100 TABLET ORAL 2 TIMES DAILY
Qty: 180 TABLET | Refills: 1 | Status: SHIPPED | OUTPATIENT
Start: 2023-02-28

## 2023-02-28 RX ORDER — ATORVASTATIN CALCIUM 40 MG/1
TABLET, FILM COATED ORAL
Qty: 90 TABLET | Refills: 3 | Status: SHIPPED | OUTPATIENT
Start: 2023-02-28

## 2023-02-28 RX ORDER — AMLODIPINE BESYLATE 5 MG/1
TABLET ORAL
Qty: 90 TABLET | Refills: 3 | Status: SHIPPED | OUTPATIENT
Start: 2023-02-28

## 2023-02-28 RX ORDER — METHOCARBAMOL 500 MG/1
500 TABLET, FILM COATED ORAL 2 TIMES DAILY PRN
Qty: 60 TABLET | Refills: 1 | Status: SHIPPED | OUTPATIENT
Start: 2023-02-28

## 2023-02-28 RX ORDER — GABAPENTIN 100 MG/1
100 CAPSULE ORAL 3 TIMES DAILY PRN
Qty: 90 CAPSULE | Refills: 1 | Status: SHIPPED | OUTPATIENT
Start: 2023-02-28 | End: 2023-03-30

## 2023-02-28 RX ORDER — GABAPENTIN 100 MG/1
100 CAPSULE ORAL 3 TIMES DAILY PRN
Qty: 90 CAPSULE | Refills: 1 | OUTPATIENT
Start: 2023-02-28 | End: 2023-03-30

## 2023-02-28 RX ORDER — LOSARTAN POTASSIUM 50 MG/1
TABLET ORAL
Qty: 90 TABLET | Refills: 3 | Status: SHIPPED | OUTPATIENT
Start: 2023-02-28

## 2023-02-28 NOTE — TELEPHONE ENCOUNTER
Patient needs a refill a refill on   gabapentin 100 mg  Amlodipine 5 mg  Metformin 500 mg  Losartan 50 mg  Atorvastatin 40 mg  Methocarbamol 500 mg  Cilostazol 100 mg  Please fill for 90 days,   Please advise

## 2023-02-28 NOTE — TELEPHONE ENCOUNTER
Medication:   Requested Prescriptions     Pending Prescriptions Disp Refills    metFORMIN (GLUCOPHAGE) 500 MG tablet 90 tablet 3     Sig: Take 1 tablet by mouth daily (with breakfast) for diabetes. gabapentin (NEURONTIN) 100 MG capsule 90 capsule 1     Sig: Take 1 capsule by mouth 3 times daily as needed (nerve pain.) for up to 30 days. amLODIPine (NORVASC) 5 MG tablet 90 tablet 3     Sig: TAKE 1 TABLET BY MOUTH EVERY DAY    losartan (COZAAR) 50 MG tablet 90 tablet 3     Sig: TAKE 1 TABLET BY MOUTH EVERY DAY    atorvastatin (LIPITOR) 40 MG tablet 90 tablet 3     Sig: TAKE 1 TABLET BY MOUTH EVERY DAY    methocarbamol (ROBAXIN) 500 MG tablet 60 tablet 1     Sig: Take 1 tablet by mouth 2 times daily as needed (muscle spasm)    cilostazol (PLETAL) 100 MG tablet 180 tablet 1     Sig: Take 1 tablet by mouth 2 times daily        Last Filled:      Patient Phone Number: [phone] (home)     Last appt: 2/16/2023   Next appt: 3/30/2023    Last OARRS: No flowsheet data found.

## 2023-03-10 ASSESSMENT — ENCOUNTER SYMPTOMS
EYES NEGATIVE: 1
GASTROINTESTINAL NEGATIVE: 1

## 2023-03-11 NOTE — PROGRESS NOTES
Patient: Miguel Gamez is a 62 y.o. male who presents today with the following Chief Complaint(s):    Chief Complaint   Patient presents with    Follow-up    Hypertension    Diabetes         He is here for a check up and F/U on hypertension, hyperlipidemia, diabetes, type 2. He is taking medication as prescribed, is aware of the importance of healthy eating and regular physical activity.          H/O elevated PSA. No LUTS. Saw urology, biopsy planned but needed 300 dollars upfront which he did not have.          He does continue to smoke cigarettes despite repeated counseling. Has not completed CT lung screen as of yet.          H/O PAD with claudication at 1/2 block. Continues on Pletal. Evaluated by vascular surgery. Options for management explained in detail. He has chosen to continue pletal declining further work up at this time.           Current Outpatient Medications   Medication Sig Dispense Refill    allopurinol (ZYLOPRIM) 300 MG tablet TAKE 1 TABLET BY MOUTH DAILY FOR GOUT 90 tablet 3    colchicine (MITIGARE) 0.6 MG capsule Lot [id number] Exp 5/2020 28 capsule 0    metFORMIN (GLUCOPHAGE) 500 MG tablet Take 1 tablet by mouth daily (with breakfast) for diabetes. 90 tablet 3    gabapentin (NEURONTIN) 100 MG capsule Take 1 capsule by mouth 3 times daily as needed (nerve pain.) for up to 30 days. 90 capsule 1    amLODIPine (NORVASC) 5 MG tablet TAKE 1 TABLET BY MOUTH EVERY DAY 90 tablet 3    losartan (COZAAR) 50 MG tablet TAKE 1 TABLET BY MOUTH EVERY DAY 90 tablet 3    atorvastatin (LIPITOR) 40 MG tablet TAKE 1 TABLET BY MOUTH EVERY DAY 90 tablet 3    methocarbamol (ROBAXIN) 500 MG tablet Take 1 tablet by mouth 2 times daily as needed (muscle spasm) 60 tablet 1    cilostazol (PLETAL) 100 MG tablet Take 1 tablet by mouth 2 times daily 180 tablet 1    aspirin 81 MG tablet Take 1 tablet by mouth daily 30 tablet 11     No current facility-administered medications for this visit.       Patient's past medical history,  surgical history, family history, medications,and allergies  were all reviewed and updated as appropriate today. Review of Systems   Constitutional: Negative. HENT: Negative. Eyes: Negative. Cardiovascular:  Negative for chest pain. Hypertension, treated with ARB, Ca blker. PAD, see HPI. Gastrointestinal: Negative. Endocrine:        T2 DM, treated with metformin. A1c 5.8. Hyperlipidemia, treated with statin, LDL previously 79. Genitourinary:         Elevated PSA. See HPI. PSA  to 4.86 from 7.97. see HPI. Musculoskeletal: Negative. Skin: Negative. Neurological: Negative. Psychiatric/Behavioral: Negative. Physical Exam  Constitutional:       General: He is not in acute distress. Appearance: He is well-developed. HENT:      Head: Normocephalic and atraumatic. Right Ear: External ear normal.      Left Ear: External ear normal.      Nose: Nose normal.   Eyes:      General: No scleral icterus. Conjunctiva/sclera: Conjunctivae normal.      Pupils: Pupils are equal, round, and reactive to light. Neck:      Thyroid: No thyromegaly. Cardiovascular:      Rate and Rhythm: Normal rate and regular rhythm. Heart sounds: Normal heart sounds. Comments: PT/DP pulses not appreciated. Extremities warm. Capillary refill sluggish. Pulmonary:      Effort: Pulmonary effort is normal.      Breath sounds: Normal breath sounds. Abdominal:      General: Bowel sounds are normal.      Palpations: Abdomen is soft. There is no mass. Musculoskeletal:      Cervical back: Normal range of motion and neck supple. Lymphadenopathy:      Cervical: No cervical adenopathy. Skin:     General: Skin is warm and dry. Neurological:      Mental Status: He is alert and oriented to person, place, and time. Deep Tendon Reflexes: Reflexes are normal and symmetric. Psychiatric:         Behavior: Behavior normal.         Thought Content:  Thought content normal. Judgment: Judgment normal.       Vitals:    02/16/23 1214   BP: 126/82   Pulse: 64   SpO2: 98%       Assessment:   Diagnosis Orders   1. Type 2 diabetes mellitus with complication, without long-term current use of insulin (HCC)  Diet, physical activity and weight reduction discussed. POCT Glucose    POCT glycosylated hemoglobin (Hb A1C)    metFORMIN (GLUCOPHAGE) 500 MG tablet   2. Essential hypertension  amLODIPine (NORVASC) 5 MG tablet  DASH and low sodium diet discussed. 3. Elevated PSA  Encouraging that PSA has decreased. Advised urology f/u.    4. Mixed hyperlipidemia  Heart healthy diet and statin compliance discussed. Goal for this patient < 60.    5. PAD: continue Pletal.  Risk factor control stressed. Vascular surgery F/U encouraged. 6.      Cigarette nicotine dependence: Continue to work on complete smoking cessation. Read the literature, take medication if prescribed, contact me if there are further questions. CT lung screen discussed. Plan:  See plans above.

## 2023-03-30 ENCOUNTER — OFFICE VISIT (OUTPATIENT)
Dept: INTERNAL MEDICINE CLINIC | Age: 63
End: 2023-03-30

## 2023-03-30 VITALS
DIASTOLIC BLOOD PRESSURE: 88 MMHG | WEIGHT: 176.8 LBS | HEART RATE: 66 BPM | OXYGEN SATURATION: 99 % | SYSTOLIC BLOOD PRESSURE: 126 MMHG | BODY MASS INDEX: 27.69 KG/M2

## 2023-03-30 DIAGNOSIS — K92.2 GASTROINTESTINAL HEMORRHAGE, UNSPECIFIED GASTROINTESTINAL HEMORRHAGE TYPE: ICD-10-CM

## 2023-03-30 DIAGNOSIS — D50.8 OTHER IRON DEFICIENCY ANEMIA: ICD-10-CM

## 2023-03-30 DIAGNOSIS — I10 PRIMARY HYPERTENSION: ICD-10-CM

## 2023-03-30 DIAGNOSIS — E11.8 TYPE 2 DIABETES MELLITUS WITH COMPLICATION, WITHOUT LONG-TERM CURRENT USE OF INSULIN (HCC): ICD-10-CM

## 2023-03-30 DIAGNOSIS — E11.9 TYPE 2 DIABETES MELLITUS TREATED WITHOUT INSULIN (HCC): ICD-10-CM

## 2023-03-30 DIAGNOSIS — Z00.00 MEDICARE ANNUAL WELLNESS VISIT, SUBSEQUENT: Primary | ICD-10-CM

## 2023-03-30 DIAGNOSIS — Z65.8 INADEQUATE SOCIAL SUPPORT: ICD-10-CM

## 2023-03-30 LAB
CHP ED QC CHECK: NORMAL
GLUCOSE BLD-MCNC: 100 MG/DL

## 2023-03-30 RX ORDER — FERROUS SULFATE 325(65) MG
325 TABLET ORAL
Qty: 90 TABLET | Refills: 1 | Status: SHIPPED | OUTPATIENT
Start: 2023-03-30

## 2023-03-30 ASSESSMENT — PATIENT HEALTH QUESTIONNAIRE - PHQ9
SUM OF ALL RESPONSES TO PHQ9 QUESTIONS 1 & 2: 0
SUM OF ALL RESPONSES TO PHQ QUESTIONS 1-9: 0
SUM OF ALL RESPONSES TO PHQ QUESTIONS 1-9: 0
1. LITTLE INTEREST OR PLEASURE IN DOING THINGS: 0
SUM OF ALL RESPONSES TO PHQ QUESTIONS 1-9: 0
SUM OF ALL RESPONSES TO PHQ QUESTIONS 1-9: 0
2. FEELING DOWN, DEPRESSED OR HOPELESS: 0

## 2023-03-30 ASSESSMENT — LIFESTYLE VARIABLES: HOW OFTEN DO YOU HAVE A DRINK CONTAINING ALCOHOL: NEVER

## 2023-10-04 ENCOUNTER — TELEPHONE (OUTPATIENT)
Dept: INTERNAL MEDICINE CLINIC | Age: 63
End: 2023-10-04

## 2023-10-04 DIAGNOSIS — E11.8 TYPE 2 DIABETES MELLITUS WITH COMPLICATION, WITHOUT LONG-TERM CURRENT USE OF INSULIN (HCC): ICD-10-CM

## 2023-10-04 DIAGNOSIS — D50.8 OTHER IRON DEFICIENCY ANEMIA: ICD-10-CM

## 2023-10-04 DIAGNOSIS — E78.5 HYPERLIPIDEMIA, UNSPECIFIED HYPERLIPIDEMIA TYPE: ICD-10-CM

## 2023-10-04 DIAGNOSIS — I10 ESSENTIAL HYPERTENSION: ICD-10-CM

## 2023-10-04 RX ORDER — LOSARTAN POTASSIUM 50 MG/1
TABLET ORAL
Qty: 90 TABLET | Refills: 3 | Status: SHIPPED | OUTPATIENT
Start: 2023-10-04

## 2023-10-04 RX ORDER — AMLODIPINE BESYLATE 5 MG/1
TABLET ORAL
Qty: 90 TABLET | Refills: 3 | Status: SHIPPED | OUTPATIENT
Start: 2023-10-04

## 2023-10-04 RX ORDER — ATORVASTATIN CALCIUM 40 MG/1
TABLET, FILM COATED ORAL
Qty: 90 TABLET | Refills: 3 | Status: SHIPPED | OUTPATIENT
Start: 2023-10-04

## 2023-10-04 RX ORDER — FERROUS SULFATE 325(65) MG
325 TABLET ORAL
Qty: 90 TABLET | Refills: 1 | Status: SHIPPED | OUTPATIENT
Start: 2023-10-04

## 2023-10-04 NOTE — TELEPHONE ENCOUNTER
Medication:   Requested Prescriptions     Pending Prescriptions Disp Refills    ferrous sulfate (IRON 325) 325 (65 Fe) MG tablet 90 tablet 1     Sig: Take 1 tablet by mouth daily (with breakfast)    metFORMIN (GLUCOPHAGE) 500 MG tablet 90 tablet 3     Sig: Take 1 tablet by mouth daily (with breakfast) for diabetes.     amLODIPine (NORVASC) 5 MG tablet 90 tablet 3     Sig: TAKE 1 TABLET BY MOUTH EVERY DAY    losartan (COZAAR) 50 MG tablet 90 tablet 3     Sig: TAKE 1 TABLET BY MOUTH EVERY DAY    atorvastatin (LIPITOR) 40 MG tablet 90 tablet 3     Sig: TAKE 1 TABLET BY MOUTH EVERY DAY        Last Filled:  02/28/23    Patient Phone Number: [phone] (home)     Last appt: 3/30/2023   Next appt: 11/7/2023

## 2023-10-04 NOTE — TELEPHONE ENCOUNTER
Patient came in office and is needing a refill on medication ferrous sulfate and metformin amlodipine losartan and atorvastatin patient moved back from [place] and has an appt scheduled for November 7th. Please advise.

## 2024-01-03 ENCOUNTER — TELEPHONE (OUTPATIENT)
Dept: INTERNAL MEDICINE CLINIC | Age: 64
End: 2024-01-03

## 2024-01-03 DIAGNOSIS — E55.9 VITAMIN D DEFICIENCY: Primary | ICD-10-CM

## 2024-01-03 DIAGNOSIS — I10 ESSENTIAL HYPERTENSION: ICD-10-CM

## 2024-01-03 DIAGNOSIS — D50.8 OTHER IRON DEFICIENCY ANEMIA: ICD-10-CM

## 2024-01-03 DIAGNOSIS — Z87.39 HISTORY OF GOUT: ICD-10-CM

## 2024-01-03 RX ORDER — ALLOPURINOL 300 MG/1
300 TABLET ORAL DAILY
Qty: 90 TABLET | Refills: 3 | Status: SHIPPED | OUTPATIENT
Start: 2024-01-03

## 2024-01-03 RX ORDER — ERGOCALCIFEROL 1.25 MG/1
50000 CAPSULE ORAL WEEKLY
Qty: 12 CAPSULE | Refills: 1 | Status: SHIPPED | OUTPATIENT
Start: 2024-01-03

## 2024-01-03 RX ORDER — LOSARTAN POTASSIUM 50 MG/1
TABLET ORAL
Qty: 90 TABLET | Refills: 3 | Status: SHIPPED | OUTPATIENT
Start: 2024-01-03

## 2024-01-03 RX ORDER — FERROUS SULFATE 325(65) MG
325 TABLET ORAL
Qty: 90 TABLET | Refills: 1 | Status: SHIPPED | OUTPATIENT
Start: 2024-01-03

## 2024-01-03 NOTE — TELEPHONE ENCOUNTER
Medication:   Requested Prescriptions     Pending Prescriptions Disp Refills    ferrous sulfate (IRON 325) 325 (65 Fe) MG tablet 90 tablet 1     Sig: Take 1 tablet by mouth daily (with breakfast)    allopurinol (ZYLOPRIM) 300 MG tablet 90 tablet 3     Sig: Take 1 tablet by mouth daily for gout    losartan (COZAAR) 50 MG tablet 90 tablet 3     Sig: TAKE 1 TABLET BY MOUTH EVERY DAY    vitamin D (ERGOCALCIFEROL) 1.25 MG (74030 UT) CAPS capsule 12 capsule 1     Sig: Take 1 capsule by mouth once a week        Last Filled:      Patient Phone Number: [phone] (home)     Last appt: 3/30/2023   Next appt: 2/2/2024

## 2024-02-02 ENCOUNTER — OFFICE VISIT (OUTPATIENT)
Dept: INTERNAL MEDICINE CLINIC | Age: 64
End: 2024-02-02
Payer: MEDICARE

## 2024-02-02 VITALS
BODY MASS INDEX: 26.78 KG/M2 | SYSTOLIC BLOOD PRESSURE: 128 MMHG | OXYGEN SATURATION: 99 % | DIASTOLIC BLOOD PRESSURE: 70 MMHG | WEIGHT: 171 LBS | HEART RATE: 78 BPM

## 2024-02-02 DIAGNOSIS — Z60.9 PROBLEM SITUATION RELATING TO SOCIAL AND PERSONAL HISTORY: ICD-10-CM

## 2024-02-02 DIAGNOSIS — E78.2 MIXED HYPERLIPIDEMIA: ICD-10-CM

## 2024-02-02 DIAGNOSIS — K63.5 POLYP OF COLON, UNSPECIFIED PART OF COLON, UNSPECIFIED TYPE: ICD-10-CM

## 2024-02-02 DIAGNOSIS — E11.8 TYPE 2 DIABETES MELLITUS WITH COMPLICATION, WITHOUT LONG-TERM CURRENT USE OF INSULIN (HCC): Primary | ICD-10-CM

## 2024-02-02 DIAGNOSIS — I73.9 PAD (PERIPHERAL ARTERY DISEASE) (HCC): ICD-10-CM

## 2024-02-02 DIAGNOSIS — Z12.5 PROSTATE CANCER SCREENING: ICD-10-CM

## 2024-02-02 DIAGNOSIS — F17.218 CIGARETTE NICOTINE DEPENDENCE WITH OTHER NICOTINE-INDUCED DISORDER: ICD-10-CM

## 2024-02-02 DIAGNOSIS — I10 PRIMARY HYPERTENSION: ICD-10-CM

## 2024-02-02 DIAGNOSIS — Z60.9 HIGH RISK SOCIAL SITUATION: ICD-10-CM

## 2024-02-02 DIAGNOSIS — R97.20 ELEVATED PSA: ICD-10-CM

## 2024-02-02 LAB
CHP ED QC CHECK: NORMAL
GLUCOSE BLD-MCNC: 93 MG/DL
HBA1C MFR BLD: 5.5 %

## 2024-02-02 PROCEDURE — 82962 GLUCOSE BLOOD TEST: CPT | Performed by: INTERNAL MEDICINE

## 2024-02-02 PROCEDURE — 3074F SYST BP LT 130 MM HG: CPT | Performed by: INTERNAL MEDICINE

## 2024-02-02 PROCEDURE — 3017F COLORECTAL CA SCREEN DOC REV: CPT | Performed by: INTERNAL MEDICINE

## 2024-02-02 PROCEDURE — G8427 DOCREV CUR MEDS BY ELIG CLIN: HCPCS | Performed by: INTERNAL MEDICINE

## 2024-02-02 PROCEDURE — 3078F DIAST BP <80 MM HG: CPT | Performed by: INTERNAL MEDICINE

## 2024-02-02 PROCEDURE — 3044F HG A1C LEVEL LT 7.0%: CPT | Performed by: INTERNAL MEDICINE

## 2024-02-02 PROCEDURE — 2022F DILAT RTA XM EVC RTNOPTHY: CPT | Performed by: INTERNAL MEDICINE

## 2024-02-02 PROCEDURE — G8417 CALC BMI ABV UP PARAM F/U: HCPCS | Performed by: INTERNAL MEDICINE

## 2024-02-02 PROCEDURE — G8484 FLU IMMUNIZE NO ADMIN: HCPCS | Performed by: INTERNAL MEDICINE

## 2024-02-02 PROCEDURE — 1123F ACP DISCUSS/DSCN MKR DOCD: CPT | Performed by: INTERNAL MEDICINE

## 2024-02-02 PROCEDURE — 83036 HEMOGLOBIN GLYCOSYLATED A1C: CPT | Performed by: INTERNAL MEDICINE

## 2024-02-02 PROCEDURE — 36415 COLL VENOUS BLD VENIPUNCTURE: CPT | Performed by: INTERNAL MEDICINE

## 2024-02-02 PROCEDURE — 99215 OFFICE O/P EST HI 40 MIN: CPT | Performed by: INTERNAL MEDICINE

## 2024-02-02 PROCEDURE — 4004F PT TOBACCO SCREEN RCVD TLK: CPT | Performed by: INTERNAL MEDICINE

## 2024-02-02 RX ORDER — BACLOFEN 10 MG/1
10 TABLET ORAL 2 TIMES DAILY
COMMUNITY

## 2024-02-02 SDOH — SOCIAL STABILITY - SOCIAL INSECURITY: PROBLEM RELATED TO SOCIAL ENVIRONMENT, UNSPECIFIED: Z60.9

## 2024-02-02 ASSESSMENT — PATIENT HEALTH QUESTIONNAIRE - PHQ9
SUM OF ALL RESPONSES TO PHQ QUESTIONS 1-9: 0
2. FEELING DOWN, DEPRESSED OR HOPELESS: 0
SUM OF ALL RESPONSES TO PHQ9 QUESTIONS 1 & 2: 0
1. LITTLE INTEREST OR PLEASURE IN DOING THINGS: 0
SUM OF ALL RESPONSES TO PHQ QUESTIONS 1-9: 0

## 2024-02-02 NOTE — PROGRESS NOTES
medications,and allergies  were all reviewed and updated as appropriate today.      Review of Systems      Physical Exam    Vitals:    02/02/24 0904   BP: 128/70   Pulse: 78   SpO2: 99%       Assessment:  Encounter Diagnosis   Name Primary?    Type 2 diabetes mellitus with complication, without long-term current use of insulin (HCC) Yes       Plan:  1. Type 2 diabetes mellitus with complication, without long-term current use of insulin (HCC)  ***  - POCT Glucose  - POCT glycosylated hemoglobin (Hb A1C)

## 2024-02-03 LAB
ALBUMIN SERPL-MCNC: 4.5 G/DL (ref 3.4–5)
ALBUMIN/GLOB SERPL: 1.5 {RATIO} (ref 1.1–2.2)
ALP SERPL-CCNC: 125 U/L (ref 40–129)
ALT SERPL-CCNC: 14 U/L (ref 10–40)
ANION GAP SERPL CALCULATED.3IONS-SCNC: 14 MMOL/L (ref 3–16)
AST SERPL-CCNC: 19 U/L (ref 15–37)
BASOPHILS # BLD: 0 K/UL (ref 0–0.2)
BASOPHILS NFR BLD: 0.6 %
BILIRUB SERPL-MCNC: 0.3 MG/DL (ref 0–1)
BUN SERPL-MCNC: 7 MG/DL (ref 7–20)
CALCIUM SERPL-MCNC: 9.5 MG/DL (ref 8.3–10.6)
CHLORIDE SERPL-SCNC: 105 MMOL/L (ref 99–110)
CHOLEST SERPL-MCNC: 142 MG/DL (ref 0–199)
CO2 SERPL-SCNC: 24 MMOL/L (ref 21–32)
CREAT SERPL-MCNC: 0.7 MG/DL (ref 0.8–1.3)
CREAT UR-MCNC: 92.4 MG/DL (ref 39–259)
DEPRECATED RDW RBC AUTO: 14.8 % (ref 12.4–15.4)
EOSINOPHIL # BLD: 0.1 K/UL (ref 0–0.6)
EOSINOPHIL NFR BLD: 2.2 %
GFR SERPLBLD CREATININE-BSD FMLA CKD-EPI: >60 ML/MIN/{1.73_M2}
GLUCOSE SERPL-MCNC: 88 MG/DL (ref 70–99)
HCT VFR BLD AUTO: 41.4 % (ref 40.5–52.5)
HDLC SERPL-MCNC: 45 MG/DL (ref 40–60)
HGB BLD-MCNC: 14.2 G/DL (ref 13.5–17.5)
LDLC SERPL CALC-MCNC: 83 MG/DL
LYMPHOCYTES # BLD: 3.4 K/UL (ref 1–5.1)
LYMPHOCYTES NFR BLD: 51.9 %
MCH RBC QN AUTO: 30.7 PG (ref 26–34)
MCHC RBC AUTO-ENTMCNC: 34.2 G/DL (ref 31–36)
MCV RBC AUTO: 89.8 FL (ref 80–100)
MICROALBUMIN UR DL<=1MG/L-MCNC: <1.2 MG/DL
MICROALBUMIN/CREAT UR: NORMAL MG/G (ref 0–30)
MONOCYTES # BLD: 0.6 K/UL (ref 0–1.3)
MONOCYTES NFR BLD: 8.8 %
NEUTROPHILS # BLD: 2.4 K/UL (ref 1.7–7.7)
NEUTROPHILS NFR BLD: 36.5 %
PLATELET # BLD AUTO: 257 K/UL (ref 135–450)
PMV BLD AUTO: 7.9 FL (ref 5–10.5)
POTASSIUM SERPL-SCNC: 4.2 MMOL/L (ref 3.5–5.1)
PROT SERPL-MCNC: 7.6 G/DL (ref 6.4–8.2)
PSA SERPL DL<=0.01 NG/ML-MCNC: 4.02 NG/ML (ref 0–4)
RBC # BLD AUTO: 4.61 M/UL (ref 4.2–5.9)
SODIUM SERPL-SCNC: 143 MMOL/L (ref 136–145)
TRIGL SERPL-MCNC: 72 MG/DL (ref 0–150)
TSH SERPL DL<=0.005 MIU/L-ACNC: 0.37 UIU/ML (ref 0.27–4.2)
VLDLC SERPL CALC-MCNC: 14 MG/DL
WBC # BLD AUTO: 6.5 K/UL (ref 4–11)

## 2024-05-09 ENCOUNTER — OFFICE VISIT (OUTPATIENT)
Dept: INTERNAL MEDICINE CLINIC | Age: 64
End: 2024-05-09

## 2024-05-09 VITALS
TEMPERATURE: 98.5 F | DIASTOLIC BLOOD PRESSURE: 80 MMHG | HEART RATE: 71 BPM | BODY MASS INDEX: 27.88 KG/M2 | WEIGHT: 178 LBS | SYSTOLIC BLOOD PRESSURE: 124 MMHG | OXYGEN SATURATION: 98 %

## 2024-05-09 DIAGNOSIS — E78.2 MIXED HYPERLIPIDEMIA: ICD-10-CM

## 2024-05-09 DIAGNOSIS — K63.5 POLYP OF COLON, UNSPECIFIED PART OF COLON, UNSPECIFIED TYPE: ICD-10-CM

## 2024-05-09 DIAGNOSIS — I73.9 PAD (PERIPHERAL ARTERY DISEASE) (HCC): Primary | ICD-10-CM

## 2024-05-09 DIAGNOSIS — Z55.0 ILLITERACY: ICD-10-CM

## 2024-05-09 DIAGNOSIS — E11.8 TYPE 2 DIABETES WITH COMPLICATION (HCC): ICD-10-CM

## 2024-05-09 DIAGNOSIS — F17.218 CIGARETTE NICOTINE DEPENDENCE WITH OTHER NICOTINE-INDUCED DISORDER: ICD-10-CM

## 2024-05-09 DIAGNOSIS — I73.9 CLAUDICATION (HCC): ICD-10-CM

## 2024-05-09 DIAGNOSIS — I10 PRIMARY HYPERTENSION: ICD-10-CM

## 2024-05-09 RX ORDER — CILOSTAZOL 100 MG/1
100 TABLET ORAL 2 TIMES DAILY
Qty: 60 TABLET | Refills: 3 | Status: SHIPPED | OUTPATIENT
Start: 2024-05-09

## 2024-05-09 SDOH — EDUCATIONAL SECURITY - EDUCATION ATTAINMENT: ILITERACY AND LOW LEVEL LITERACY: Z55.0

## 2024-05-09 SDOH — ECONOMIC STABILITY: INCOME INSECURITY: HOW HARD IS IT FOR YOU TO PAY FOR THE VERY BASICS LIKE FOOD, HOUSING, MEDICAL CARE, AND HEATING?: NOT HARD AT ALL

## 2024-05-09 SDOH — ECONOMIC STABILITY: FOOD INSECURITY: WITHIN THE PAST 12 MONTHS, THE FOOD YOU BOUGHT JUST DIDN'T LAST AND YOU DIDN'T HAVE MONEY TO GET MORE.: NEVER TRUE

## 2024-05-09 SDOH — ECONOMIC STABILITY: FOOD INSECURITY: WITHIN THE PAST 12 MONTHS, YOU WORRIED THAT YOUR FOOD WOULD RUN OUT BEFORE YOU GOT MONEY TO BUY MORE.: NEVER TRUE

## 2024-05-10 ENCOUNTER — HOSPITAL ENCOUNTER (OUTPATIENT)
Dept: VASCULAR LAB | Age: 64
End: 2024-05-10
Attending: INTERNAL MEDICINE
Payer: MEDICARE

## 2024-05-10 DIAGNOSIS — I73.9 PAD (PERIPHERAL ARTERY DISEASE) (HCC): ICD-10-CM

## 2024-05-10 DIAGNOSIS — I73.9 CLAUDICATION (HCC): ICD-10-CM

## 2024-05-10 PROCEDURE — 93925 LOWER EXTREMITY STUDY: CPT

## 2024-05-11 LAB
VAS LEFT ABI: 0.5
VAS LEFT ARM BP: 202 MMHG
VAS LEFT ATA DIST PSV: 7.8 CM/S
VAS LEFT CFA DIST PSV: 97.5 CM/S
VAS LEFT CFA MID PSV: 0 CM/S
VAS LEFT CFA PROX PSV: 159 CM/S
VAS LEFT DORSALIS PEDIS BP: 96 MMHG
VAS LEFT PERONEAL MID PSV: 11.1 CM/S
VAS LEFT PFA PROX PSV: 55 CM/S
VAS LEFT POP A DIST PSV: 22.1 CM/S
VAS LEFT POP A PROX PSV: 21.6 CM/S
VAS LEFT POP A PROX VEL RATIO: 0.39
VAS LEFT PTA BP: 110 MMHG
VAS LEFT PTA DIST PSV: 38 CM/S
VAS LEFT PTA MID PSV: 29.7 CM/S
VAS LEFT SFA DIST PSV: 56 CM/S
VAS LEFT SFA DIST VEL RATIO: 1.16
VAS LEFT SFA MID PSV: 48.1 CM/S
VAS LEFT SFA MID VEL RATIO: 1.12
VAS LEFT SFA PROX PSV: 42.8 CM/S
VAS LEFT SFA PROX VEL RATIO: 0.27
VAS RIGHT ABI: 0.55
VAS RIGHT ARM BP: 220 MMHG
VAS RIGHT ATA DIST PSV: 14.9 CM/S
VAS RIGHT CFA DIST PSV: 132 CM/S
VAS RIGHT CFA PROX PSV: 106 CM/S
VAS RIGHT DORSALIS PEDIS BP: 122 MMHG
VAS RIGHT PERONEAL MID PSV: 11 CM/S
VAS RIGHT PFA PROX PSV: 80.7 CM/S
VAS RIGHT POP A DIST PSV: 24.8 CM/S
VAS RIGHT POP A PROX PSV: 29.8 CM/S
VAS RIGHT POP A PROX VEL RATIO: 0.73
VAS RIGHT PTA BP: 112 MMHG
VAS RIGHT PTA DIST PSV: 27 CM/S
VAS RIGHT PTA MID PSV: 23.4 CM/S
VAS RIGHT SFA DIST PSV: 41 CM/S
VAS RIGHT SFA DIST VEL RATIO: 0.99
VAS RIGHT SFA MID PSV: 41.6 CM/S
VAS RIGHT SFA MID VEL RATIO: 0.4
VAS RIGHT SFA PROX PSV: 103 CM/S
VAS RIGHT SFA PROX VEL RATIO: 0.8

## 2024-05-11 PROCEDURE — 93925 LOWER EXTREMITY STUDY: CPT | Performed by: SURGERY

## 2024-05-22 ASSESSMENT — ENCOUNTER SYMPTOMS: EYES NEGATIVE: 1

## 2024-05-22 NOTE — PROGRESS NOTES
Patient: Miguel Gamez is a 63 y.o. male who presents today with the following Chief Complaint(s):    Chief Complaint   Patient presents with    Leg Pain     Burning  and aching and patient stated he has to stop walking d/t his wind cut out on him          HPIburning in calf especially with walking. Tingling also.   Can walk 25 yards before having to stop. Discomfort at rest.   Feet warm, sluggish capillary refill.  No PT/DP pulse appreciated.   Current Outpatient Medications   Medication Sig Dispense Refill    baclofen (LIORESAL) 10 MG tablet Take 1 tablet by mouth 2 times daily      ferrous sulfate (IRON 325) 325 (65 Fe) MG tablet Take 1 tablet by mouth daily (with breakfast) 90 tablet 1    allopurinol (ZYLOPRIM) 300 MG tablet Take 1 tablet by mouth daily for gout 90 tablet 3    losartan (COZAAR) 50 MG tablet TAKE 1 TABLET BY MOUTH EVERY DAY 90 tablet 3    vitamin D (ERGOCALCIFEROL) 1.25 MG (71672 UT) CAPS capsule Take 1 capsule by mouth once a week 12 capsule 1    metFORMIN (GLUCOPHAGE) 500 MG tablet Take 1 tablet by mouth daily (with breakfast) for diabetes. 90 tablet 3    amLODIPine (NORVASC) 5 MG tablet TAKE 1 TABLET BY MOUTH EVERY DAY 90 tablet 3    atorvastatin (LIPITOR) 40 MG tablet TAKE 1 TABLET BY MOUTH EVERY DAY 90 tablet 3    methocarbamol (ROBAXIN) 500 MG tablet Take 1 tablet by mouth 2 times daily as needed (muscle spasm) 60 tablet 1    cilostazol (PLETAL) 100 MG tablet Take 1 tablet by mouth 2 times daily 180 tablet 1    colchicine (MITIGARE) 0.6 MG capsule Lot [id number] 5/2020 28 capsule 0    gabapentin (NEURONTIN) 100 MG capsule Take 1 capsule by mouth 3 times daily as needed (nerve pain.) for up to 30 days. 90 capsule 1    aspirin 81 MG tablet Take 1 tablet by mouth daily 30 tablet 11     No current facility-administered medications for this visit.       Patient's past medical history, surgical history, family history, medications,and allergies  were all reviewed and updated as appropriate 
doppler studies.   6.      Cigarette nicotine dependence: Continue to work on complete smoking cessation.  Read the literature, take medication if prescribed, contact me if there are further questions. CT lung screen discussed.   7.    Colon polyps. See HPI. Will assist in scheduling GI. Will need transportation to and from colonoscopy. Will have care coordinator assist in these arrangements.   8.    Illiteracy: patient not able to read and write well. Does a fairly good job with medication but have to watch for redundancy ( he may have been taking 2 of the same pill because he had 2 bottles of one medication. Needs assistance with negotiating complex medical needs including testing, transportation, medication compliance, assessing financial burden and how to best afford necessary testing.  CC and OM involvement.          Plan:  See plans above.

## 2024-06-11 ENCOUNTER — OFFICE VISIT (OUTPATIENT)
Dept: INTERNAL MEDICINE CLINIC | Age: 64
End: 2024-06-11
Payer: MEDICARE

## 2024-06-11 VITALS
SYSTOLIC BLOOD PRESSURE: 140 MMHG | WEIGHT: 175 LBS | HEART RATE: 57 BPM | BODY MASS INDEX: 27.41 KG/M2 | DIASTOLIC BLOOD PRESSURE: 82 MMHG | OXYGEN SATURATION: 99 %

## 2024-06-11 DIAGNOSIS — E11.8 TYPE 2 DIABETES MELLITUS WITH COMPLICATION, WITHOUT LONG-TERM CURRENT USE OF INSULIN (HCC): Primary | ICD-10-CM

## 2024-06-11 DIAGNOSIS — I10 ESSENTIAL HYPERTENSION: ICD-10-CM

## 2024-06-11 DIAGNOSIS — E78.2 MIXED HYPERLIPIDEMIA: ICD-10-CM

## 2024-06-11 DIAGNOSIS — K63.5 POLYP OF COLON, UNSPECIFIED PART OF COLON, UNSPECIFIED TYPE: ICD-10-CM

## 2024-06-11 DIAGNOSIS — I73.9 PAD (PERIPHERAL ARTERY DISEASE) (HCC): ICD-10-CM

## 2024-06-11 DIAGNOSIS — F17.218 CIGARETTE NICOTINE DEPENDENCE WITH OTHER NICOTINE-INDUCED DISORDER: ICD-10-CM

## 2024-06-11 DIAGNOSIS — Z55.0 ILLITERATE: ICD-10-CM

## 2024-06-11 DIAGNOSIS — R97.20 ELEVATED PSA: ICD-10-CM

## 2024-06-11 LAB
CHP ED QC CHECK: NORMAL
GLUCOSE BLD-MCNC: 97 MG/DL
HBA1C MFR BLD: 5.6 %

## 2024-06-11 PROCEDURE — 99215 OFFICE O/P EST HI 40 MIN: CPT | Performed by: INTERNAL MEDICINE

## 2024-06-11 PROCEDURE — 4004F PT TOBACCO SCREEN RCVD TLK: CPT | Performed by: INTERNAL MEDICINE

## 2024-06-11 PROCEDURE — 3079F DIAST BP 80-89 MM HG: CPT | Performed by: INTERNAL MEDICINE

## 2024-06-11 PROCEDURE — G8417 CALC BMI ABV UP PARAM F/U: HCPCS | Performed by: INTERNAL MEDICINE

## 2024-06-11 PROCEDURE — 1123F ACP DISCUSS/DSCN MKR DOCD: CPT | Performed by: INTERNAL MEDICINE

## 2024-06-11 PROCEDURE — 82962 GLUCOSE BLOOD TEST: CPT | Performed by: INTERNAL MEDICINE

## 2024-06-11 PROCEDURE — 2022F DILAT RTA XM EVC RTNOPTHY: CPT | Performed by: INTERNAL MEDICINE

## 2024-06-11 PROCEDURE — 3017F COLORECTAL CA SCREEN DOC REV: CPT | Performed by: INTERNAL MEDICINE

## 2024-06-11 PROCEDURE — G2211 COMPLEX E/M VISIT ADD ON: HCPCS | Performed by: INTERNAL MEDICINE

## 2024-06-11 PROCEDURE — 3077F SYST BP >= 140 MM HG: CPT | Performed by: INTERNAL MEDICINE

## 2024-06-11 PROCEDURE — 3044F HG A1C LEVEL LT 7.0%: CPT | Performed by: INTERNAL MEDICINE

## 2024-06-11 PROCEDURE — 83036 HEMOGLOBIN GLYCOSYLATED A1C: CPT | Performed by: INTERNAL MEDICINE

## 2024-06-11 PROCEDURE — G8427 DOCREV CUR MEDS BY ELIG CLIN: HCPCS | Performed by: INTERNAL MEDICINE

## 2024-06-11 RX ORDER — LOSARTAN POTASSIUM 50 MG/1
TABLET ORAL
Qty: 90 TABLET | Refills: 3 | Status: SHIPPED | OUTPATIENT
Start: 2024-06-11

## 2024-06-11 SDOH — EDUCATIONAL SECURITY - EDUCATION ATTAINMENT: ILITERACY AND LOW LEVEL LITERACY: Z55.0

## 2024-06-11 NOTE — PROGRESS NOTES
Patient: Miguel Gamez is a 63 y.o. male who presents today with the following Chief Complaint(s):    Chief Complaint   Patient presents with    Follow-up         HPIPAD see arterial dopplers. No rest pain. Walks > 100 ft before pain. SOB accompanies. No CP.   Was only taking pletal once daily. Advised twice daily. Vascular surgery consultation.   Current Outpatient Medications   Medication Sig Dispense Refill    cilostazol (PLETAL) 100 MG tablet Take 1 tablet by mouth 2 times daily 60 tablet 3    ferrous sulfate (IRON 325) 325 (65 Fe) MG tablet Take 1 tablet by mouth daily (with breakfast) 90 tablet 1    allopurinol (ZYLOPRIM) 300 MG tablet Take 1 tablet by mouth daily for gout 90 tablet 3    losartan (COZAAR) 50 MG tablet TAKE 1 TABLET BY MOUTH EVERY DAY 90 tablet 3    vitamin D (ERGOCALCIFEROL) 1.25 MG (19525 UT) CAPS capsule Take 1 capsule by mouth once a week 12 capsule 1    metFORMIN (GLUCOPHAGE) 500 MG tablet Take 1 tablet by mouth daily (with breakfast) for diabetes. 90 tablet 3    atorvastatin (LIPITOR) 40 MG tablet TAKE 1 TABLET BY MOUTH EVERY DAY 90 tablet 3    gabapentin (NEURONTIN) 100 MG capsule Take 1 capsule by mouth 3 times daily as needed (nerve pain.) for up to 30 days. 90 capsule 1    methocarbamol (ROBAXIN) 500 MG tablet Take 1 tablet by mouth 2 times daily as needed (muscle spasm) 60 tablet 1    cilostazol (PLETAL) 100 MG tablet Take 1 tablet by mouth 2 times daily 180 tablet 1    aspirin 81 MG tablet Take 1 tablet by mouth daily 30 tablet 11    baclofen (LIORESAL) 10 MG tablet Take 1 tablet by mouth 2 times daily      amLODIPine (NORVASC) 5 MG tablet TAKE 1 TABLET BY MOUTH EVERY DAY 90 tablet 3    colchicine (MITIGARE) 0.6 MG capsule Lot [id number] 5/2020 28 capsule 0     No current facility-administered medications for this visit.       Patient's past medical history, surgical history, family history, medications,and allergies  were all reviewed and updated as appropriate today.      Review  dizziness/vomiting

## 2024-07-09 ASSESSMENT — ENCOUNTER SYMPTOMS: EYES NEGATIVE: 1

## 2024-07-09 NOTE — PROGRESS NOTES
Patient: Miguel Gamez is a 64 y.o. male who presents today with the following Chief Complaint(s):    Chief Complaint   Patient presents with    Follow-up         He is here for a check up and F/U on hypertension, hyperlipidemia, diabetes, type 2. He is taking medication as prescribed, is aware of the importance of healthy eating and regular physical activity.          H/O elevated PSA. No LUTS. Saw urology, biopsy planned but needed 300 dollars upfront which he did not have. Current PSA decreased from 7.97 to 4.02.         He does continue to smoke cigarettes despite repeated counseling. Has not completed CT lung screen as of yet. Also needs AAA screen.          H/O PAD with claudication at 1/2 block. Continues on Pletal. Evaluated by vascular surgery. Options for management explained in detail. He has chosen to continue pletal declining further work up at this time. Today he complains of burning in his calf muscles especially with walking. Can walk about 25 yards before needing to stop. Some discomfort at rest that usually resolves.          H/O colon polyps per 2021 colonoscopy. 10 polyps removed. No cancer. Because of polyp load colonoscopy in 1 year indicated. He did not f/u. Transportation and accompaniment are issues. Needs to be scheduled thru insurance company. Will get care coordinator involved. Patient's phone number [phone].           Current Outpatient Medications   Medication Sig Dispense Refill    metFORMIN (GLUCOPHAGE) 500 MG tablet Take 1 tablet by mouth daily (with breakfast) for diabetes. 90 tablet 3    losartan (COZAAR) 50 MG tablet TAKE 1 TABLET BY MOUTH EVERY DAY 90 tablet 3    cilostazol (PLETAL) 100 MG tablet Take 1 tablet by mouth 2 times daily 60 tablet 3    ferrous sulfate (IRON 325) 325 (65 Fe) MG tablet Take 1 tablet by mouth daily (with breakfast) 90 tablet 1    allopurinol (ZYLOPRIM) 300 MG tablet Take 1 tablet by mouth daily for gout 90 tablet 3    vitamin D (ERGOCALCIFEROL)

## 2024-07-24 ENCOUNTER — OFFICE VISIT (OUTPATIENT)
Dept: INTERNAL MEDICINE CLINIC | Age: 64
End: 2024-07-24
Payer: MEDICARE

## 2024-07-24 VITALS
SYSTOLIC BLOOD PRESSURE: 136 MMHG | HEART RATE: 86 BPM | WEIGHT: 176 LBS | BODY MASS INDEX: 27.57 KG/M2 | DIASTOLIC BLOOD PRESSURE: 82 MMHG | OXYGEN SATURATION: 98 %

## 2024-07-24 DIAGNOSIS — Z60.9 PROBLEM SITUATION RELATING TO SOCIAL AND PERSONAL HISTORY: ICD-10-CM

## 2024-07-24 DIAGNOSIS — E78.5 HYPERLIPIDEMIA, UNSPECIFIED HYPERLIPIDEMIA TYPE: ICD-10-CM

## 2024-07-24 DIAGNOSIS — Z55.0 ILLITERACY AND LOW-LEVEL LITERACY: ICD-10-CM

## 2024-07-24 DIAGNOSIS — R97.20 ELEVATED PSA: ICD-10-CM

## 2024-07-24 DIAGNOSIS — R06.02 SOB (SHORTNESS OF BREATH): ICD-10-CM

## 2024-07-24 DIAGNOSIS — E11.8 TYPE 2 DIABETES MELLITUS WITH COMPLICATION, WITHOUT LONG-TERM CURRENT USE OF INSULIN (HCC): Primary | ICD-10-CM

## 2024-07-24 DIAGNOSIS — I73.9 PAD (PERIPHERAL ARTERY DISEASE) (HCC): ICD-10-CM

## 2024-07-24 DIAGNOSIS — F17.218 CIGARETTE NICOTINE DEPENDENCE WITH OTHER NICOTINE-INDUCED DISORDER: ICD-10-CM

## 2024-07-24 DIAGNOSIS — K63.5 POLYP OF COLON, UNSPECIFIED PART OF COLON, UNSPECIFIED TYPE: ICD-10-CM

## 2024-07-24 LAB
CHP ED QC CHECK: NORMAL
GLUCOSE BLD-MCNC: 98 MG/DL

## 2024-07-24 PROCEDURE — 82962 GLUCOSE BLOOD TEST: CPT | Performed by: INTERNAL MEDICINE

## 2024-07-24 PROCEDURE — 1123F ACP DISCUSS/DSCN MKR DOCD: CPT | Performed by: INTERNAL MEDICINE

## 2024-07-24 PROCEDURE — 3079F DIAST BP 80-89 MM HG: CPT | Performed by: INTERNAL MEDICINE

## 2024-07-24 PROCEDURE — G2211 COMPLEX E/M VISIT ADD ON: HCPCS | Performed by: INTERNAL MEDICINE

## 2024-07-24 PROCEDURE — 2022F DILAT RTA XM EVC RTNOPTHY: CPT | Performed by: INTERNAL MEDICINE

## 2024-07-24 PROCEDURE — 3017F COLORECTAL CA SCREEN DOC REV: CPT | Performed by: INTERNAL MEDICINE

## 2024-07-24 PROCEDURE — 4004F PT TOBACCO SCREEN RCVD TLK: CPT | Performed by: INTERNAL MEDICINE

## 2024-07-24 PROCEDURE — G8417 CALC BMI ABV UP PARAM F/U: HCPCS | Performed by: INTERNAL MEDICINE

## 2024-07-24 PROCEDURE — 99215 OFFICE O/P EST HI 40 MIN: CPT | Performed by: INTERNAL MEDICINE

## 2024-07-24 PROCEDURE — 3075F SYST BP GE 130 - 139MM HG: CPT | Performed by: INTERNAL MEDICINE

## 2024-07-24 PROCEDURE — G8427 DOCREV CUR MEDS BY ELIG CLIN: HCPCS | Performed by: INTERNAL MEDICINE

## 2024-07-24 PROCEDURE — 3044F HG A1C LEVEL LT 7.0%: CPT | Performed by: INTERNAL MEDICINE

## 2024-07-24 RX ORDER — ATORVASTATIN CALCIUM 40 MG/1
TABLET, FILM COATED ORAL
Qty: 90 TABLET | Refills: 3 | Status: SHIPPED | OUTPATIENT
Start: 2024-07-24

## 2024-07-24 SDOH — SOCIAL STABILITY - SOCIAL INSECURITY: PROBLEM RELATED TO SOCIAL ENVIRONMENT, UNSPECIFIED: Z60.9

## 2024-07-24 SDOH — EDUCATIONAL SECURITY - EDUCATION ATTAINMENT: ILITERACY AND LOW LEVEL LITERACY: Z55.0

## 2024-07-24 NOTE — PATIENT INSTRUCTIONS
[phone]  Address: [address], [address]   Website: [email or web address]        [facility]  What they offer:  Rent and utility assistance     Phone Number: [phone]  Address: [address] [address]    Website: http://www.[employer]/     [facility]  What they offer:  Food, rent and utility assistance     Phone Number: [phone]  [address] [address]   Website: https://[employer]/     [facility]  What they offer:  Food, rent and utility assistance     Phone Number: [phone]  [address] [facility] [address]   Website: [email or web address]://[email or web address].org/usn/     [facility]   What they offer:  Rent and utility assistance and [place] [facility] waiver    Phone Number: [phone]  Address: [address] [place] [address]   Website: https://www.[employer].[place]./      [facility]  What they offer:  Rent and utility assistance and [place] [facility] waiver     Phone Number: [phone]  Address: [address] [place] [address]   Website: [email or web address].[email or web address]       Emergency Rental Assistance:   Coalition on Homelessness and Housing in [place]: Cite provides list of Community Resources that assist with emergency rental assistance for all [facility] in [place]  https://cohhio.org/        How to apply for Medicaid or Health Insurance   Online:  Apply online at:     Phone:  By calling your area Job and Family Services:   https://[place]fs.[place].gov/about/mvmhv-nwisbsxz-ryxvqvxem/gerqa-fcvcxdxh-qvsbbwfvc    Online:  Apply at Health Insurance Marketplace:     Mail or drop off a paper application to your local Department of .  Applications can be downloaded and printed here: https://benefits.[place].gov/   Mailing may take longer than other methods of applying.   Find your nearest local

## 2024-07-24 NOTE — PROGRESS NOTES
Patient: Miguel Gamez is a 64 y.o. male who presents today with the following Chief Complaint(s):    Chief Complaint   Patient presents with    Follow-up    Diabetes         HPIcigarettes smokes 1/2 and then puts it out. Pack last for 4 days. Had shingles shot needs second. Has not seen ortho as of yet.   Takes pletal but still has sx. 1/2 blk. Start burning and has to stop. Also gets sob  Wants to wait on scheduling.   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 Retired 10 years.   2500 monthly 1500 SS, 1000 pension. Hard to make ends meet.   Current Outpatient Medications   Medication Sig Dispense Refill    metFORMIN (GLUCOPHAGE) 500 MG tablet Take 1 tablet by mouth daily (with breakfast) for diabetes. 90 tablet 3    losartan (COZAAR) 50 MG tablet TAKE 1 TABLET BY MOUTH EVERY DAY 90 tablet 3    cilostazol (PLETAL) 100 MG tablet Take 1 tablet by mouth 2 times daily 60 tablet 3    baclofen (LIORESAL) 10 MG tablet Take 1 tablet by mouth 2 times daily      ferrous sulfate (IRON 325) 325 (65 Fe) MG tablet Take 1 tablet by mouth daily (with breakfast) 90 tablet 1    allopurinol (ZYLOPRIM) 300 MG tablet Take 1 tablet by mouth daily for gout 90 tablet 3    vitamin D (ERGOCALCIFEROL) 1.25 MG (95221 UT) CAPS capsule Take 1 capsule by mouth once a week 12 capsule 1    amLODIPine (NORVASC) 5 MG tablet TAKE 1 TABLET BY MOUTH EVERY DAY 90 tablet 3    atorvastatin (LIPITOR) 40 MG tablet TAKE 1 TABLET BY MOUTH EVERY DAY 90 tablet 3    gabapentin (NEURONTIN) 100 MG capsule Take 1 capsule by mouth 3 times daily as needed (nerve pain.) for up to 30 days. 90 capsule 1    methocarbamol (ROBAXIN) 500 MG tablet Take 1 tablet by mouth 2 times daily as needed (muscle spasm) 60 tablet 1    cilostazol (PLETAL) 100 MG tablet Take 1 tablet by mouth 2 times daily 180 tablet 1    colchicine (MITIGARE) 0.6 MG capsule Lot [id number] Exp 5/2020 28 capsule 0    aspirin 81 MG

## 2024-08-05 ASSESSMENT — ENCOUNTER SYMPTOMS: EYES NEGATIVE: 1

## 2024-08-05 NOTE — PROGRESS NOTES
Patient: Miguel Gamez is a 64 y.o. male who presents today with the following Chief Complaint(s):    Chief Complaint   Patient presents with    Follow-up    Diabetes         He is here for a check up and F/U on hypertension, hyperlipidemia, diabetes, type 2. He is taking medication as prescribed, is aware of the importance of healthy eating and regular physical activity.          H/O elevated PSA. No LUTS. Saw urology, biopsy planned but needed 300 dollars upfront which he did not have. Current PSA decreased from 7.97 to 4.02.         He does continue to smoke cigarettes despite repeated counseling. 1 pack of cigarettes last 4 days. Has not completed CT lung screen as of yet. Also needs AAA screen.          H/O PAD with claudication at 1/2 block. Continues on Pletal. Evaluated by vascular surgery. Options for management explained in detail. He has chosen to continue pletal declining further work up at this time. Today he complains of burning in his calf muscles especially with walking. Can walk about 25 yards before needing to stop. Some discomfort at rest that usually resolves.          H/O colon polyps per 2021 colonoscopy. 10 polyps removed. No cancer. Because of polyp load colonoscopy in 1 year indicated. He did not f/u. Transportation and accompaniment are issues. Needs to be scheduled thru insurance company. Will get care coordinator involved. Patient's phone number [phone].          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 Retired 10 years ago. Monthly custodial is 2500, 1500 social security, 1000  pension. Finds it hard to make ends meet.          Of note is that patient can minimally read or write. This has been challenging.           Current Outpatient Medications   Medication Sig Dispense Refill    atorvastatin (LIPITOR) 40 MG tablet TAKE 1 TABLET BY MOUTH EVERY DAY 90 tablet 3    metFORMIN

## 2024-08-06 ENCOUNTER — TELEPHONE (OUTPATIENT)
Dept: OTHER | Age: 64
End: 2024-08-06

## 2024-08-06 NOTE — TELEPHONE ENCOUNTER
[facility] attempted to contact patient regarding CHW services and referral. CHW left message with CHW info and call back number. CHW will follow up in one week.

## 2024-08-14 ENCOUNTER — TELEPHONE (OUTPATIENT)
Dept: OTHER | Age: 64
End: 2024-08-14

## 2024-08-14 NOTE — TELEPHONE ENCOUNTER
PP-CHW attempted to contact patient regarding CHW referral. CHW will send letter of CHW information and if no call back in 10 business days CHW will close referral.

## 2024-08-23 DIAGNOSIS — G62.9 NEUROPATHY: ICD-10-CM

## 2024-08-23 RX ORDER — GABAPENTIN 100 MG/1
CAPSULE ORAL
Qty: 90 CAPSULE | Refills: 1 | OUTPATIENT
Start: 2024-08-23

## 2024-08-23 RX ORDER — GABAPENTIN 100 MG/1
100 CAPSULE ORAL 3 TIMES DAILY PRN
Qty: 90 CAPSULE | Refills: 1 | Status: SHIPPED | OUTPATIENT
Start: 2024-08-23 | End: 2024-10-22

## 2024-08-23 NOTE — TELEPHONE ENCOUNTER
Pt is requesting refill on medication -gabapentin (NEURONTIN) 100 MG capsule [[id number]]  ENDED      Pharmacy  -  [facility]/pharmacy #[id number] - [place], [place] - [address] - P [phone] - F [phone]  [address] [facility] [address]  Phone: [phone]  Fax: [phone]

## 2024-09-02 DIAGNOSIS — E55.9 VITAMIN D DEFICIENCY: ICD-10-CM

## 2024-09-02 DIAGNOSIS — D50.8 OTHER IRON DEFICIENCY ANEMIA: ICD-10-CM

## 2024-09-03 RX ORDER — ERGOCALCIFEROL 1.25 MG/1
50000 CAPSULE, LIQUID FILLED ORAL WEEKLY
Qty: 12 CAPSULE | Refills: 1 | Status: SHIPPED | OUTPATIENT
Start: 2024-09-03

## 2024-09-03 RX ORDER — CILOSTAZOL 100 MG/1
100 TABLET ORAL 2 TIMES DAILY
Qty: 180 TABLET | Refills: 1 | Status: SHIPPED | OUTPATIENT
Start: 2024-09-03

## 2024-09-03 RX ORDER — FERROUS SULFATE 325(65) MG
1 TABLET ORAL
Qty: 90 TABLET | Refills: 1 | Status: SHIPPED | OUTPATIENT
Start: 2024-09-03

## 2024-10-24 ENCOUNTER — OFFICE VISIT (OUTPATIENT)
Dept: INTERNAL MEDICINE CLINIC | Age: 64
End: 2024-10-24

## 2024-10-24 VITALS
HEART RATE: 62 BPM | SYSTOLIC BLOOD PRESSURE: 144 MMHG | OXYGEN SATURATION: 98 % | HEIGHT: 67 IN | TEMPERATURE: 97.8 F | DIASTOLIC BLOOD PRESSURE: 82 MMHG | WEIGHT: 180 LBS | BODY MASS INDEX: 28.25 KG/M2

## 2024-10-24 DIAGNOSIS — Z00.00 MEDICARE ANNUAL WELLNESS VISIT, SUBSEQUENT: Primary | ICD-10-CM

## 2024-10-24 DIAGNOSIS — M62.838 MUSCLE SPASM: ICD-10-CM

## 2024-10-24 DIAGNOSIS — I10 PRIMARY HYPERTENSION: ICD-10-CM

## 2024-10-24 DIAGNOSIS — E11.8 TYPE 2 DIABETES MELLITUS WITH COMPLICATION, WITHOUT LONG-TERM CURRENT USE OF INSULIN (HCC): ICD-10-CM

## 2024-10-24 DIAGNOSIS — E78.5 HYPERLIPIDEMIA, UNSPECIFIED HYPERLIPIDEMIA TYPE: ICD-10-CM

## 2024-10-24 DIAGNOSIS — I73.9 PAD (PERIPHERAL ARTERY DISEASE) (HCC): ICD-10-CM

## 2024-10-24 DIAGNOSIS — Z87.39 HISTORY OF GOUT: ICD-10-CM

## 2024-10-24 LAB
CHP ED QC CHECK: NORMAL
GLUCOSE BLD-MCNC: 122 MG/DL
HBA1C MFR BLD: 5.3 %

## 2024-10-24 RX ORDER — ATORVASTATIN CALCIUM 40 MG/1
TABLET, FILM COATED ORAL
Qty: 90 TABLET | Refills: 3 | Status: SHIPPED | OUTPATIENT
Start: 2024-10-24

## 2024-10-24 RX ORDER — MULTIVIT-MIN/FERROUS GLUCONATE 9 MG/15 ML
15 LIQUID (ML) ORAL DAILY
COMMUNITY

## 2024-10-24 RX ORDER — ALLOPURINOL 300 MG/1
300 TABLET ORAL DAILY
Qty: 90 TABLET | Refills: 3 | Status: SHIPPED | OUTPATIENT
Start: 2024-10-24

## 2024-10-24 RX ORDER — METHOCARBAMOL 500 MG/1
500 TABLET, FILM COATED ORAL 2 TIMES DAILY PRN
Qty: 60 TABLET | Refills: 1 | Status: SHIPPED | OUTPATIENT
Start: 2024-10-24

## 2024-10-24 ASSESSMENT — PATIENT HEALTH QUESTIONNAIRE - PHQ9
SUM OF ALL RESPONSES TO PHQ QUESTIONS 1-9: 1
1. LITTLE INTEREST OR PLEASURE IN DOING THINGS: NOT AT ALL
SUM OF ALL RESPONSES TO PHQ QUESTIONS 1-9: 1
2. FEELING DOWN, DEPRESSED OR HOPELESS: SEVERAL DAYS
SUM OF ALL RESPONSES TO PHQ9 QUESTIONS 1 & 2: 1
SUM OF ALL RESPONSES TO PHQ QUESTIONS 1-9: 1
SUM OF ALL RESPONSES TO PHQ QUESTIONS 1-9: 1

## 2024-10-24 ASSESSMENT — LIFESTYLE VARIABLES
HOW MANY STANDARD DRINKS CONTAINING ALCOHOL DO YOU HAVE ON A TYPICAL DAY: PATIENT DOES NOT DRINK
HOW OFTEN DO YOU HAVE A DRINK CONTAINING ALCOHOL: NEVER

## 2024-10-24 NOTE — PROGRESS NOTES
average, how many minutes do you engage in exercise at this level?: 20 min    Interventions:  Discussion.   Suggestions.   Questions answered.   Literature provided.   PAD history. See above for appropriate adjustments in exercise.         Vision Screen:  Do you have difficulty driving, watching TV, or doing any of your daily activities because of your eyesight?: No  Have you had an eye exam within the past year?: (!) No    Interventions:  Discussion.   Suggestions.   Literature provided.     Safety:  Do you have non-slip mats or non-slip surfaces or shower bars or grab bars in your shower or bathtub?: (!) No    Interventions:  Discussion.   Suggestions.   Questions answered.   Literature provided.       Tobacco Use:    Tobacco Use      Smoking status: Every Day        Packs/day: 0.40        Years: 0.4 packs/day for 15.0 years (6.0 ttl pk-yrs)        Types: Cigarettes      Smokeless tobacco: Never     Interventions:  Continue to work on complete smoking cessation.  Read the literature, take medication if prescribed, contact me if there are further questions.                      Objective   Vitals:    10/24/24 0817 10/24/24 0834   BP: (!) 154/84 (!) 144/82   Pulse: 62    Temp: 97.8 °F (36.6 °C)    TempSrc: Temporal    SpO2: 98%    Weight: 81.6 kg (180 lb)    Height: 1.702 m (5' 7\")       Body mass index is 28.19 kg/m².                  No Known Allergies  Prior to Visit Medications    Medication Sig Taking? Authorizing Provider   Multiple Vitamins-Minerals (CENTRUM/CERTA-CORINNA WITH MINERALS ORAL) solution Take 15 mLs by mouth daily Yes Provider, MD Yahir   vitamin D (ERGOCALCIFEROL) 1.25 MG (40727 UT) CAPS capsule TAKE 1 CAPSULE BY MOUTH ONE TIME PER WEEK Yes Dl Rodriguez MD   ferrous sulfate (IRON 325) 325 (65 Fe) MG tablet TAKE 1 TABLET BY MOUTH EVERY DAY WITH BREAKFAST Yes Dl Rodriguez MD   gabapentin (NEURONTIN) 100 MG capsule Take 1 capsule by mouth 3 times daily as needed (nerve pain.) for up to 60

## 2024-10-24 NOTE — PATIENT INSTRUCTIONS
Learning About Being Active as an Older Adult  Why is being active important as you get older?     Being active is one of the best things you can do for your health. And it's never too late to start. Being active--or getting active, if you aren't already--has definite benefits. It can:  Give you more energy,  Keep your mind sharp.  Improve balance to reduce your risk of falls.  Help you manage chronic illness with fewer medicines.  No matter how old you are, how fit you are, or what health problems you have, there is a form of activity that will work for you. And the more physical activity you can do, the better your overall health will be.  What kinds of activity can help you stay healthy?  Being more active will make your daily activities easier. Physical activity includes planned exercise and things you do in daily life. There are four types of activity:  Aerobic.  Doing aerobic activity makes your heart and lungs strong.  Includes walking, dancing, and gardening.  Aim for at least 2½ hours spread throughout the week.  It improves your energy and can help you sleep better.  Muscle-strengthening.  This type of activity can help maintain muscle and strengthen bones.  Includes climbing stairs, using resistance bands, and lifting or carrying heavy loads.  Aim for at least twice a week.  It can help protect the knees and other joints.  Stretching.  Stretching gives you better range of motion in joints and muscles.  Includes upper arm stretches, calf stretches, and gentle yoga.  Aim for at least twice a week, preferably after your muscles are warmed up from other activities.  It can help you function better in daily life.  Balancing.  This helps you stay coordinated and have good posture.  Includes heel-to-toe walking, krystian chi, and certain types of yoga.  Aim for at least 3 days a week.  It can reduce your risk of falling.  Even if you have a hard time meeting the recommendations, it's better to be more active

## 2024-12-02 DIAGNOSIS — I10 ESSENTIAL HYPERTENSION: ICD-10-CM

## 2024-12-02 RX ORDER — AMLODIPINE BESYLATE 5 MG/1
TABLET ORAL
Qty: 90 TABLET | Refills: 3 | Status: SHIPPED | OUTPATIENT
Start: 2024-12-02

## 2025-02-17 ENCOUNTER — OFFICE VISIT (OUTPATIENT)
Dept: INTERNAL MEDICINE CLINIC | Age: 65
End: 2025-02-17
Payer: MEDICARE

## 2025-02-17 VITALS
BODY MASS INDEX: 28.66 KG/M2 | DIASTOLIC BLOOD PRESSURE: 80 MMHG | OXYGEN SATURATION: 98 % | HEART RATE: 66 BPM | WEIGHT: 183 LBS | SYSTOLIC BLOOD PRESSURE: 198 MMHG

## 2025-02-17 DIAGNOSIS — R07.9 CHEST PAIN, UNSPECIFIED TYPE: Primary | ICD-10-CM

## 2025-02-17 PROCEDURE — 3017F COLORECTAL CA SCREEN DOC REV: CPT | Performed by: STUDENT IN AN ORGANIZED HEALTH CARE EDUCATION/TRAINING PROGRAM

## 2025-02-17 PROCEDURE — G8427 DOCREV CUR MEDS BY ELIG CLIN: HCPCS | Performed by: STUDENT IN AN ORGANIZED HEALTH CARE EDUCATION/TRAINING PROGRAM

## 2025-02-17 PROCEDURE — 4004F PT TOBACCO SCREEN RCVD TLK: CPT | Performed by: STUDENT IN AN ORGANIZED HEALTH CARE EDUCATION/TRAINING PROGRAM

## 2025-02-17 PROCEDURE — 99215 OFFICE O/P EST HI 40 MIN: CPT | Performed by: STUDENT IN AN ORGANIZED HEALTH CARE EDUCATION/TRAINING PROGRAM

## 2025-02-17 PROCEDURE — 3077F SYST BP >= 140 MM HG: CPT | Performed by: STUDENT IN AN ORGANIZED HEALTH CARE EDUCATION/TRAINING PROGRAM

## 2025-02-17 PROCEDURE — 1159F MED LIST DOCD IN RCRD: CPT | Performed by: STUDENT IN AN ORGANIZED HEALTH CARE EDUCATION/TRAINING PROGRAM

## 2025-02-17 PROCEDURE — 3079F DIAST BP 80-89 MM HG: CPT | Performed by: STUDENT IN AN ORGANIZED HEALTH CARE EDUCATION/TRAINING PROGRAM

## 2025-02-17 PROCEDURE — 1123F ACP DISCUSS/DSCN MKR DOCD: CPT | Performed by: STUDENT IN AN ORGANIZED HEALTH CARE EDUCATION/TRAINING PROGRAM

## 2025-02-17 PROCEDURE — G8417 CALC BMI ABV UP PARAM F/U: HCPCS | Performed by: STUDENT IN AN ORGANIZED HEALTH CARE EDUCATION/TRAINING PROGRAM

## 2025-02-17 PROCEDURE — 93000 ELECTROCARDIOGRAM COMPLETE: CPT | Performed by: STUDENT IN AN ORGANIZED HEALTH CARE EDUCATION/TRAINING PROGRAM

## 2025-02-17 SDOH — ECONOMIC STABILITY: FOOD INSECURITY: WITHIN THE PAST 12 MONTHS, THE FOOD YOU BOUGHT JUST DIDN'T LAST AND YOU DIDN'T HAVE MONEY TO GET MORE.: NEVER TRUE

## 2025-02-17 SDOH — ECONOMIC STABILITY: FOOD INSECURITY: WITHIN THE PAST 12 MONTHS, YOU WORRIED THAT YOUR FOOD WOULD RUN OUT BEFORE YOU GOT MONEY TO BUY MORE.: NEVER TRUE

## 2025-02-17 ASSESSMENT — PATIENT HEALTH QUESTIONNAIRE - PHQ9
SUM OF ALL RESPONSES TO PHQ QUESTIONS 1-9: 0
SUM OF ALL RESPONSES TO PHQ9 QUESTIONS 1 & 2: 0
2. FEELING DOWN, DEPRESSED OR HOPELESS: NOT AT ALL
1. LITTLE INTEREST OR PLEASURE IN DOING THINGS: NOT AT ALL

## 2025-02-17 NOTE — PROGRESS NOTES
Miguel Gamez (:  1960) is a 64 y.o. male,Established patient, here for evaluation of the following chief complaint(s):  Chest Pain (Numbness in both hands ) and Shortness of Breath         Assessment & Plan  Chest pain, unspecified type   Acute condition, new,     DDx includes ACS [EKG w/ t wave inversions (of uncertain chronicity) cannot r/o acute inferolateral ischemia], hypertensive emergency, pancreatitis, aortic dissection, peptic ulcer disease among other considerations. Given high risk patient with concerning symptoms, urgent presentation to the ED for evaluation advised.  Offered EMS transport, however patient insists upon using private vehicle.    Orders:    EKG 12 Lead      No follow-ups on file.       Subjective   HPI  Mr. Gamez is a 64-year-old man with hypertension, peripheral arterial disease, type 2 diabetes, hyperlipidemia, and a ~30 pack-year smoking history who presents with 1 day of acute onset chest pain associated with chronic shortness of breath worsened with exertion.  The pain is present at rest and has been consistent since it started last night.  He describes the pain as a centralized pressure and tightness causing constant discomfort.    He has not had any associated nausea or vomiting & his symptoms are not made better or worse by eating.  He notes that he has not had much of an appetite this morning. He does however report that his bilateral hands have been numb this morning.    Regarding smoking.  He smokes a third of a pack to half a pack a day and insist upon not using any medication or nicotine replacement therapy to assist with smoking cessation.  Review of Systems       Objective   Physical Exam  Vitals reviewed.   Constitutional:       Appearance: Normal appearance. He is not ill-appearing or diaphoretic.   HENT:      Head: Normocephalic and atraumatic.   Cardiovascular:      Rate and Rhythm: Normal rate.   Pulmonary:      Effort: Pulmonary effort is normal. No

## 2025-02-18 ENCOUNTER — TELEPHONE (OUTPATIENT)
Dept: INTERNAL MEDICINE CLINIC | Age: 65
End: 2025-02-18

## 2025-02-18 NOTE — TELEPHONE ENCOUNTER
Called pt to see if pt went to ED on 2/17/25 as advised by Dr. Escalante. Pt stated that he did not go to ED as he was afraid that they would tell him that he needs to stop smoking completely. Pt also stated that he is feeling much better today. Pt stated that he has been working all morning and drinking water. Stated that he has taken his BP medication and is not feeling chest pains or any other symptom that he was feeling at time of visit.

## 2025-03-02 DIAGNOSIS — D50.8 OTHER IRON DEFICIENCY ANEMIA: ICD-10-CM

## 2025-03-03 NOTE — TELEPHONE ENCOUNTER
Medication:   Requested Prescriptions     Pending Prescriptions Disp Refills    ferrous sulfate (IRON 325) 325 (65 Fe) MG tablet [Pharmacy Med Name: FERROUS SULFATE 325 MG TABLET] 90 tablet 1     Sig: TAKE 1 TABLET BY MOUTH EVERY DAY WITH BREAKFAST        Last Filled:      Patient Phone Number: [phone] (home)     Last appt: 2/17/2025   Next appt: Visit date not found    Last OARRS:        No data to display

## 2025-03-04 RX ORDER — FERROUS SULFATE 325(65) MG
1 TABLET ORAL
Qty: 90 TABLET | Refills: 1 | Status: SHIPPED | OUTPATIENT
Start: 2025-03-04

## 2025-03-07 RX ORDER — CILOSTAZOL 100 MG/1
100 TABLET ORAL 2 TIMES DAILY
Qty: 180 TABLET | Refills: 1 | Status: SHIPPED | OUTPATIENT
Start: 2025-03-07

## 2025-03-07 NOTE — TELEPHONE ENCOUNTER
Medication:   Requested Prescriptions     Pending Prescriptions Disp Refills    cilostazol (PLETAL) 100 MG tablet [Pharmacy Med Name: CILOSTAZOL 100 MG TABLET] 180 tablet 1     Sig: TAKE 1 TABLET BY MOUTH TWICE A DAY        Last Filled:      Patient Phone Number: [phone] (home)     Last appt: 2/17/2025   Next appt: Visit date not found    Last OARRS:        No data to display

## 2025-03-10 DIAGNOSIS — G62.9 NEUROPATHY: ICD-10-CM

## 2025-03-10 RX ORDER — GABAPENTIN 100 MG/1
CAPSULE ORAL
Qty: 90 CAPSULE | Refills: 1 | Status: SHIPPED | OUTPATIENT
Start: 2025-03-10 | End: 2025-05-09

## 2025-03-10 NOTE — TELEPHONE ENCOUNTER
Medication:   Requested Prescriptions     Pending Prescriptions Disp Refills    gabapentin (NEURONTIN) 100 MG capsule [Pharmacy Med Name: GABAPENTIN 100 MG CAPSULE] 90 capsule 1     Sig: TAKE 1 CAPSULE BY MOUTH 3 TIMES A DAY AS NEEDED FOR NERVE PAIN FOR UP TO 30 DAYS        Last Filled:      Patient Phone Number: [phone] (home)     Last appt: 2/17/2025   Next appt: Visit date not found    Last OARRS:        No data to display

## 2025-05-17 DIAGNOSIS — G62.9 NEUROPATHY: ICD-10-CM

## 2025-05-19 RX ORDER — GABAPENTIN 100 MG/1
CAPSULE ORAL
Qty: 90 CAPSULE | Refills: 1 | Status: SHIPPED | OUTPATIENT
Start: 2025-05-19 | End: 2025-07-18

## 2025-05-19 NOTE — TELEPHONE ENCOUNTER
Medication:   Requested Prescriptions     Pending Prescriptions Disp Refills    gabapentin (NEURONTIN) 100 MG capsule [Pharmacy Med Name: GABAPENTIN 100 MG CAPSULE] 90 capsule 1     Sig: TAKE 1 CAPSULE BY MOUTH 3 TIMES A DAY AS NEEDED FOR NERVE PAIN FOR UP TO 30 DAYS        Last Filled:      Patient Phone Number: [phone] (home)     Last appt: 2/17/2025   Next appt: Visit date not found    Last OARRS:        No data to display

## 2025-08-14 DIAGNOSIS — G62.9 NEUROPATHY: ICD-10-CM

## 2025-08-14 RX ORDER — GABAPENTIN 100 MG/1
CAPSULE ORAL
Qty: 90 CAPSULE | Refills: 1 | Status: SHIPPED | OUTPATIENT
Start: 2025-08-14 | End: 2025-10-13

## 2025-09-05 DIAGNOSIS — I10 ESSENTIAL HYPERTENSION: ICD-10-CM

## 2025-09-05 DIAGNOSIS — D50.8 OTHER IRON DEFICIENCY ANEMIA: ICD-10-CM

## 2025-09-07 RX ORDER — LOSARTAN POTASSIUM 50 MG/1
50 TABLET ORAL DAILY
Qty: 90 TABLET | Refills: 3 | Status: SHIPPED | OUTPATIENT
Start: 2025-09-07

## 2025-09-07 RX ORDER — FERROUS SULFATE 325(65) MG
1 TABLET ORAL
Qty: 90 TABLET | Refills: 1 | Status: SHIPPED | OUTPATIENT
Start: 2025-09-07